# Patient Record
Sex: MALE | Race: WHITE | HISPANIC OR LATINO | Employment: FULL TIME | URBAN - METROPOLITAN AREA
[De-identification: names, ages, dates, MRNs, and addresses within clinical notes are randomized per-mention and may not be internally consistent; named-entity substitution may affect disease eponyms.]

---

## 2017-01-24 ENCOUNTER — TELEPHONE (OUTPATIENT)
Dept: MEDICAL GROUP | Facility: MEDICAL CENTER | Age: 37
End: 2017-01-24

## 2017-01-24 NOTE — TELEPHONE ENCOUNTER
Future Appointments       Provider Department Center    2/1/2017 7:40 AM Giovani Johansen M.D. [facility] [address]      Called Mark Wood in order to verify health topics prior to the Annual/N2U fior:      1)  All medications were updated? yes  2)  Allergies were updated? yes  3)  All care teams were updated? N\A  4)  All pharmacies were updated? yes and N\A          Health Maintenance Due   Topic Date Due   • IMM DTaP/Tdap/Td Vaccine (1 - Tdap) 09/13/1999   • IMM INFLUENZA (1) 09/01/2016              Current Outpatient Prescriptions   Medication Sig Dispense Refill   • CLINDAMYCIN  MG PO CAPS Take 1 Cap by mouth 3 times a day. 21 Cap 0   • HYDROCODONE-ACETAMINOPHEN 5-500 MG PO TABS Take 1-2 Tabs by mouth every four hours as needed for Mild Pain. 10 Each 0     No current facility-administered medications for this visit.     5)  Web Iz Recommendations:         1) Tdap, Influenza                      7)  Previous PCP Med Rec will be obtained via:       1) None       8)  Notes to provider or MA:       1) Establish care        2) Blood test       Pt was encouraged to keep the appointment and to arrive at least 30 minutes early. Patient is also aware that they must be on time or they would need to reschedule.    Provider name: DR JOHANSEN

## 2017-02-01 ENCOUNTER — OFFICE VISIT (OUTPATIENT)
Dept: MEDICAL GROUP | Facility: MEDICAL CENTER | Age: 37
End: 2017-02-01
Payer: COMMERCIAL

## 2017-02-01 VITALS
BODY MASS INDEX: 29.32 KG/M2 | RESPIRATION RATE: 16 BRPM | DIASTOLIC BLOOD PRESSURE: 74 MMHG | WEIGHT: 176 LBS | HEIGHT: 65 IN | TEMPERATURE: 98.7 F | SYSTOLIC BLOOD PRESSURE: 118 MMHG | OXYGEN SATURATION: 97 % | HEART RATE: 68 BPM

## 2017-02-01 DIAGNOSIS — E16.2 HYPOGLYCEMIA: ICD-10-CM

## 2017-02-01 DIAGNOSIS — Z13.220 SCREENING, LIPID: ICD-10-CM

## 2017-02-01 DIAGNOSIS — R42 DIZZINESS: ICD-10-CM

## 2017-02-01 DIAGNOSIS — E78.5 DYSLIPIDEMIA: ICD-10-CM

## 2017-02-01 DIAGNOSIS — Z23 INFLUENZA VACCINE NEEDED: ICD-10-CM

## 2017-02-01 PROCEDURE — 90471 IMMUNIZATION ADMIN: CPT | Performed by: INTERNAL MEDICINE

## 2017-02-01 PROCEDURE — 99204 OFFICE O/P NEW MOD 45 MIN: CPT | Mod: 25 | Performed by: INTERNAL MEDICINE

## 2017-02-01 PROCEDURE — 90686 IIV4 VACC NO PRSV 0.5 ML IM: CPT | Performed by: INTERNAL MEDICINE

## 2017-02-01 ASSESSMENT — PATIENT HEALTH QUESTIONNAIRE - PHQ9: CLINICAL INTERPRETATION OF PHQ2 SCORE: 0

## 2017-02-01 NOTE — PROGRESS NOTES
"CC: Establishing care dizziness    HPI:   Mark presents today with the following.    1. Dyslipidemia  Presents on having seen a doctor in several years. He reports a history of elevated cholesterol never been treated. Last blood work was several years ago values are unknown.    2. Dizziness  Biggest complaint is episodes of dizziness. He relates this to when he eats something sweet. He does have a family history of diabetes on his mother's side. He states 30 minutes after eating something sweet he'll get slightly dizzy. He reports if it's during the day was working is less than issue. He also reports he does not do his bed after meals with sweets. He did report some increased thirst proximally one month ago but that is since resolved. He denies any frequent urination. No blurred vision or slurred speech and denies abdominal pain or diarrhea. Denies chest pain or palpitations no edema.          Patient Active Problem List    Diagnosis Date Noted   • Dyslipidemia 02/01/2017       No current outpatient prescriptions on file.     No current facility-administered medications for this visit.         Allergies as of 02/01/2017   • (No Known Allergies)        ROS: As per HPI.    /74 mmHg  Pulse 68  Temp(Src) 37.1 °C (98.7 °F)  Resp 16  Ht 1.651 m (5' 5\")  Wt 79.833 kg (176 lb)  BMI 29.29 kg/m2  SpO2 97%    Physical Exam:  Gen:         Alert and oriented, No apparent distress.  Neck:        No Lymphadenopathy or Bruits.  Lungs:     Clear to auscultation bilaterally  CV:          Regular rate and rhythm. No murmurs, rubs or gallops.               Ext:          No clubbing, cyanosis, edema.      Assessment and Plan.   36 y.o. male with the following issues.    1. Dyslipidemia  sending for repeat cholesterol.    2. Dizziness  Symptoms sound most specific for possible hypoglycemia. Have made recommendations for eating more proteins if he is having carbohydrates. Avoidance of caffeine. Getting blood work along with " A1c. If blood work is normal in symptoms persist he will follow-up.  - HEMOGLOBIN A1C; Future    3. Hypoglycemia  Subjective diagnosis of hypoglycemia currently.    4. Influenza vaccine needed    - INFLUENZA VACCINE QUAD INJ >3Y(PF)    5. Screening, lipid    - COMP METABOLIC PANEL; Future  - LIPID PROFILE; Future

## 2017-02-01 NOTE — MR AVS SNAPSHOT
"        Mark Wood   2017 7:40 AM   Office Visit   MRN: [id number]    Department:  [address] [place] [address]   Dept Phone:  [phone]    Description:  Male : 1980   Provider:  Giovani Martinez M.D.           Reason for Visit     Establish Care Labs request      Allergies as of 2017     No Known Allergies      You were diagnosed with     Dyslipidemia   [336572]       Dizziness   [066080]       Hypoglycemia   [304405]       Influenza vaccine needed   [896300]       Screening, lipid   [130498]         Vital Signs     Blood Pressure Pulse Temperature Respirations Height Weight    118/74 mmHg 68 37.1 °C (98.7 °F) 16 1.651 m (5' 5\") 79.833 kg (176 lb)    Body Mass Index Oxygen Saturation Smoking Status             29.29 kg/m2 97% Never Smoker          Basic Information     Date Of Birth Sex Race Ethnicity Preferred Language    1980 Male  or   Origin (Occitan,Maltese,Djiboutian,Finnish, etc) English      Problem List              ICD-10-CM Priority Class Noted - Resolved    Dyslipidemia E78.5   2017 - Present      Health Maintenance        Date Due Completion Dates    IMM DTaP/Tdap/Td Vaccine (1 - Tdap) 1999 ---    IMM INFLUENZA (1) 2016, 2010            Current Immunizations     Influenza Vaccine Quad Inj (Pf) 2017  8:10 AM, 2010    Influenza Vaccine Quad Inj (Preserved) 2014    Tdap Vaccine 2012      Below and/or attached are the medications your provider expects you to take. Review all of your home medications and newly ordered medications with your provider and/or pharmacist. Follow medication instructions as directed by your provider and/or pharmacist. Please keep your medication list with you and share with your provider. Update the information when medications are discontinued, doses are changed, or new medications (including over-the-counter products) are added; and carry medication information at all times in the " event of emergency situations     Allergies:  No Known Allergies          Medications  Valid as of: February 01, 2017 -  8:12 AM    Generic Name Brand Name Tablet Size Instructions for use    .                 Medicines prescribed today were sent to:     [facility] [address] [place] [address], [place] - [address]    [address] [place] [address]    Phone: [phone] Fax: [phone]    Open 24 Hours?: No      Medication refill instructions:       If your prescription bottle indicates you have medication refills left, it is not necessary to call your provider’s office. Please contact your pharmacy and they will refill your medication.    If your prescription bottle indicates you do not have any refills left, you may request refills at any time through one of the following ways: The online Amity system (except Urgent Care), by calling your provider’s office, or by asking your pharmacy to contact your provider’s office with a refill request. Medication refills are processed only during regular business hours and may not be available until the next business day. Your provider may request additional information or to have a follow-up visit with you prior to refilling your medication.   *Please Note: Medication refills are assigned a new Rx number when refilled electronically. Your pharmacy may indicate that no refills were authorized even though a new prescription for the same medication is available at the pharmacy. Please request the medicine by name with the pharmacy before contacting your provider for a refill.        Your To Do List     Future Labs/Procedures Complete By Expires    COMP METABOLIC PANEL  As directed 2/2/2018    HEMOGLOBIN A1C  As directed 2/2/2018    LIPID PROFILE  As directed 2/2/2018         Amity Access Code: [email or web address]  Expires: 2/16/2017 10:23 AM    Amity  A secure, online tool to manage your health information     [employer]’s Amity® is a secure, online tool that connects you to your  personalized health information from the privacy of your home -- day or night - making it very easy for you to manage your healthcare. Once the activation process is completed, you can even access your medical information using the Picket rosy, which is available for free in the Apple Rosy store or Google Play store.     Picket provides the following levels of access (as shown below):   My Chart Features   [facility] Primary [facility] Doctor [facility]  Specialists [facility]  Urgent  Care Non-[facility]  Primary Care  Doctor   Email your healthcare team securely and privately 24/7 X X X    Manage appointments: schedule your next appointment; view details of past/upcoming appointments X      Request prescription refills. X      View recent personal medical records, including lab and immunizations X X X X   View health record, including health history, allergies, medications X X X X   Read reports about your outpatient visits, procedures, consult and ER notes X X X X   See your discharge summary, which is a recap of your hospital and/or ER visit that includes your diagnosis, lab results, and care plan. X X       How to register for Picket:  1. Go to  [email or web address].  2. Click on the Sign Up Now box, which takes you to the New Member Sign Up page. You will need to provide the following information:  a. Enter your Picket Access Code exactly as it appears at the top of this page. (You will not need to use this code after you’ve completed the sign-up process. If you do not sign up before the expiration date, you must request a new code.)   b. Enter your date of birth.   c. Enter your home email address.   d. Click Submit, and follow the next screen’s instructions.  3. Create a Picket ID. This will be your Picket login ID and cannot be changed, so think of one that is secure and easy to remember.  4. Create a Picket password. You can change your password at any time.  5. Enter your Password Reset Question and Answer. This can  be used at a later time if you forget your password.   6. Enter your e-mail address. This allows you to receive e-mail notifications when new information is available in [employer].  7. Click Sign Up. You can now view your health information.    For assistance activating your [employer] account, call [phone]

## 2017-02-04 ENCOUNTER — HOSPITAL ENCOUNTER (OUTPATIENT)
Dept: LAB | Facility: MEDICAL CENTER | Age: 37
End: 2017-02-04
Attending: INTERNAL MEDICINE
Payer: COMMERCIAL

## 2017-02-04 DIAGNOSIS — R42 DIZZINESS: ICD-10-CM

## 2017-02-04 DIAGNOSIS — Z13.220 SCREENING, LIPID: ICD-10-CM

## 2017-02-04 LAB
ALBUMIN SERPL BCP-MCNC: 4.8 G/DL (ref 3.2–4.9)
ALBUMIN/GLOB SERPL: 2 G/DL
ALP SERPL-CCNC: 82 U/L (ref 30–99)
ALT SERPL-CCNC: 73 U/L (ref 2–50)
ANION GAP SERPL CALC-SCNC: 11 MMOL/L (ref 0–11.9)
AST SERPL-CCNC: 41 U/L (ref 12–45)
BILIRUB SERPL-MCNC: 0.6 MG/DL (ref 0.1–1.5)
BUN SERPL-MCNC: 17 MG/DL (ref 8–22)
CALCIUM SERPL-MCNC: 9.5 MG/DL (ref 8.5–10.5)
CHLORIDE SERPL-SCNC: 103 MMOL/L (ref 96–112)
CHOLEST SERPL-MCNC: 236 MG/DL (ref 100–199)
CO2 SERPL-SCNC: 25 MMOL/L (ref 20–33)
CREAT SERPL-MCNC: 0.88 MG/DL (ref 0.5–1.4)
EST. AVERAGE GLUCOSE BLD GHB EST-MCNC: 105 MG/DL
GLOBULIN SER CALC-MCNC: 2.4 G/DL (ref 1.9–3.5)
GLUCOSE SERPL-MCNC: 85 MG/DL (ref 65–99)
HBA1C MFR BLD: 5.3 % (ref 0–5.6)
HDLC SERPL-MCNC: 37 MG/DL
LDLC SERPL CALC-MCNC: 133 MG/DL
POTASSIUM SERPL-SCNC: 3.9 MMOL/L (ref 3.6–5.5)
PROT SERPL-MCNC: 7.2 G/DL (ref 6–8.2)
SODIUM SERPL-SCNC: 139 MMOL/L (ref 135–145)
TRIGL SERPL-MCNC: 329 MG/DL (ref 0–149)

## 2017-02-04 PROCEDURE — 80053 COMPREHEN METABOLIC PANEL: CPT

## 2017-02-04 PROCEDURE — 36415 COLL VENOUS BLD VENIPUNCTURE: CPT

## 2017-02-04 PROCEDURE — 83036 HEMOGLOBIN GLYCOSYLATED A1C: CPT

## 2017-02-04 PROCEDURE — 80061 LIPID PANEL: CPT

## 2017-03-30 ENCOUNTER — OFFICE VISIT (OUTPATIENT)
Dept: MEDICAL GROUP | Facility: MEDICAL CENTER | Age: 37
End: 2017-03-30
Payer: COMMERCIAL

## 2017-03-30 VITALS
TEMPERATURE: 97 F | WEIGHT: 179 LBS | HEIGHT: 65 IN | SYSTOLIC BLOOD PRESSURE: 118 MMHG | BODY MASS INDEX: 29.82 KG/M2 | OXYGEN SATURATION: 96 % | DIASTOLIC BLOOD PRESSURE: 74 MMHG | RESPIRATION RATE: 16 BRPM | HEART RATE: 72 BPM

## 2017-03-30 DIAGNOSIS — E78.5 DYSLIPIDEMIA: ICD-10-CM

## 2017-03-30 DIAGNOSIS — R79.89 LFTS ABNORMAL: ICD-10-CM

## 2017-03-30 DIAGNOSIS — M54.2 NECK PAIN: ICD-10-CM

## 2017-03-30 PROCEDURE — 99214 OFFICE O/P EST MOD 30 MIN: CPT | Performed by: INTERNAL MEDICINE

## 2017-03-30 RX ORDER — TIZANIDINE 4 MG/1
4 TABLET ORAL 3 TIMES DAILY
Qty: 30 TAB | Refills: 1 | Status: SHIPPED | OUTPATIENT
Start: 2017-03-30 | End: 2017-11-19

## 2017-03-30 RX ORDER — IBUPROFEN 600 MG/1
600 TABLET ORAL EVERY 8 HOURS PRN
Qty: 30 TAB | Refills: 0 | Status: SHIPPED | OUTPATIENT
Start: 2017-03-30 | End: 2017-11-19

## 2017-03-30 NOTE — MR AVS SNAPSHOT
"        Mark Wood   3/30/2017 7:40 AM   Office Visit   MRN: [id number]    Department:  [address] [place] [address]   Dept Phone:  [phone]    Description:  Male : 1980   Provider:  Giovani Martinez M.D.           Reason for Visit     Neck Pain F/U labs      Allergies as of 3/30/2017     No Known Allergies      You were diagnosed with     Dyslipidemia   [639789]       LFTs abnormal   [475818]       Neck pain   [010222]         Vital Signs     Blood Pressure Pulse Temperature Respirations Height Weight    118/74 mmHg 72 36.1 °C (97 °F) 16 1.651 m (5' 5\") 81.194 kg (179 lb)    Body Mass Index Oxygen Saturation Smoking Status             29.79 kg/m2 96% Never Smoker          Basic Information     Date Of Birth Sex Race Ethnicity Preferred Language    1980 Male  or   Origin (Anguillan,Northern Irish,Togolese,Eric, etc) English      Problem List              ICD-10-CM Priority Class Noted - Resolved    Dyslipidemia E78.5   2017 - Present    LFTs abnormal R79.89   3/30/2017 - Present      Health Maintenance        Date Due Completion Dates    IMM DTaP/Tdap/Td Vaccine (2 - Td) 2022            Current Immunizations     Influenza Vaccine Quad Inj (Pf) 2017  8:10 AM, 2010    Influenza Vaccine Quad Inj (Preserved) 2014    Tdap Vaccine 2012      Below and/or attached are the medications your provider expects you to take. Review all of your home medications and newly ordered medications with your provider and/or pharmacist. Follow medication instructions as directed by your provider and/or pharmacist. Please keep your medication list with you and share with your provider. Update the information when medications are discontinued, doses are changed, or new medications (including over-the-counter products) are added; and carry medication information at all times in the event of emergency situations     Allergies:  No Known Allergies          Medications  " Valid as of: March 30, 2017 -  8:02 AM    Generic Name Brand Name Tablet Size Instructions for use    Ibuprofen (Tab) MOTRIN 600 MG Take 1 Tab by mouth every 8 hours as needed.        TiZANidine HCl (Tab) ZANAFLEX 4 MG Take 1 Tab by mouth 3 times a day.        .                 Medicines prescribed today were sent to:     [facility] [address] [place] [address], [place] - [address]    [address] [place] [address]    Phone: [phone] Fax: [phone]    Open 24 Hours?: No      Medication refill instructions:       If your prescription bottle indicates you have medication refills left, it is not necessary to call your provider’s office. Please contact your pharmacy and they will refill your medication.    If your prescription bottle indicates you do not have any refills left, you may request refills at any time through one of the following ways: The online [employer] system (except Urgent Care), by calling your provider’s office, or by asking your pharmacy to contact your provider’s office with a refill request. Medication refills are processed only during regular business hours and may not be available until the next business day. Your provider may request additional information or to have a follow-up visit with you prior to refilling your medication.   *Please Note: Medication refills are assigned a new Rx number when refilled electronically. Your pharmacy may indicate that no refills were authorized even though a new prescription for the same medication is available at the pharmacy. Please request the medicine by name with the pharmacy before contacting your provider for a refill.        Your To Do List     Future Labs/Procedures Complete By Expires    CBC WITH DIFFERENTIAL  As directed 3/31/2018    HEP B CORE AB IGM  As directed 3/31/2018    HEP B SURFACE AB  As directed 3/31/2018    HEP B SURFACE ANTIGEN  As directed 3/31/2018    HEP C VIRUS ANTIBODY  As directed 3/31/2018    HEPATIC FUNCTION PANEL  As directed 3/30/2018     US-LIVER AND BILIARY TREE  As directed 9/30/2017         [employer] Access Code: Activation code not generated  Current [place]err Status: Active

## 2017-03-30 NOTE — PROGRESS NOTES
"CC: Follow-up multiple issues complaining of neck pain.    HPI:   Mark presents today with the following.    1. Dyslipidemia  Presents after having blood work showing elevated triglycerides and low HDL. He reports his weight has gone up 10-15 pounds in the recent past without heavy activity.    2. LFTs abnormal  ALT also elevated at 73. He denies any significant alcohol consumption. He has no abdominal pain or jaundice no changes to stool and no nausea or vomiting. He denies any high-risk behaviors in the past.    3. Neck pain  Main complaint is left-sided neck pain. He works as a  frequently painting ceilings. States his pain is for a tendon intensity and worse with activity of her long day. He denies any numbness or tingling in his extremities and no weakness. He does take ibuprofen occasionally at low dose which is somewhat helpful.      Patient Active Problem List    Diagnosis Date Noted   • LFTs abnormal 03/30/2017   • Dyslipidemia 02/01/2017       Current Outpatient Prescriptions   Medication Sig Dispense Refill   • ibuprofen (MOTRIN) 600 MG Tab Take 1 Tab by mouth every 8 hours as needed. 30 Tab 0   • tizanidine (ZANAFLEX) 4 MG Tab Take 1 Tab by mouth 3 times a day. 30 Tab 1     No current facility-administered medications for this visit.         Allergies as of 03/30/2017   • (No Known Allergies)        ROS: As per HPI.    /74 mmHg  Pulse 72  Temp(Src) 36.1 °C (97 °F)  Resp 16  Ht 1.651 m (5' 5\")  Wt 81.194 kg (179 lb)  BMI 29.79 kg/m2  SpO2 96%    Physical Exam:  Gen:         Alert and oriented, No apparent distress.  Neck:        No Lymphadenopathy or Bruits.  Lungs:     Clear to auscultation bilaterally  CV:          Regular rate and rhythm. No murmurs, rubs or gallops.               Ext:          No clubbing, cyanosis, edema.      Assessment and Plan.   36 y.o. male with the following issues.    1. Dyslipidemia  Discussion about diet and exercise mild weight loss.    2. LFTs " abnormal  Have written for further blood work as well as ultrasound follow-up abnormalities.  - HEP B SURFACE AB; Future  - HEP B SURFACE ANTIGEN; Future  - HEP B CORE AB IGM; Future  - HEP C VIRUS ANTIBODY; Future  - HEPATIC FUNCTION PANEL; Future  - CBC WITH DIFFERENTIAL; Future  - US-LIVER AND BILIARY TREE; Future    3. Neck pain  Muscular nature have recommended conservative therapy with ibuprofen for the next 7 days as well as muscle relaxant. Discussed physical therapy if not improving will place referral.  - ibuprofen (MOTRIN) 600 MG Tab; Take 1 Tab by mouth every 8 hours as needed.  Dispense: 30 Tab; Refill: 0  - tizanidine (ZANAFLEX) 4 MG Tab; Take 1 Tab by mouth 3 times a day.  Dispense: 30 Tab; Refill: 1

## 2017-04-19 ENCOUNTER — HOSPITAL ENCOUNTER (OUTPATIENT)
Dept: RADIOLOGY | Facility: MEDICAL CENTER | Age: 37
End: 2017-04-19
Attending: INTERNAL MEDICINE
Payer: COMMERCIAL

## 2017-04-19 DIAGNOSIS — R79.89 LFTS ABNORMAL: ICD-10-CM

## 2017-04-19 PROCEDURE — 76705 ECHO EXAM OF ABDOMEN: CPT

## 2017-06-23 ENCOUNTER — OFFICE VISIT (OUTPATIENT)
Dept: MEDICAL GROUP | Facility: PHYSICIAN GROUP | Age: 37
End: 2017-06-23
Payer: COMMERCIAL

## 2017-06-23 VITALS
TEMPERATURE: 97.6 F | WEIGHT: 169 LBS | BODY MASS INDEX: 28.16 KG/M2 | HEART RATE: 76 BPM | SYSTOLIC BLOOD PRESSURE: 108 MMHG | OXYGEN SATURATION: 97 % | HEIGHT: 65 IN | RESPIRATION RATE: 14 BRPM | DIASTOLIC BLOOD PRESSURE: 70 MMHG

## 2017-06-23 DIAGNOSIS — J01.00 SUBACUTE MAXILLARY SINUSITIS: ICD-10-CM

## 2017-06-23 PROCEDURE — 99214 OFFICE O/P EST MOD 30 MIN: CPT | Performed by: FAMILY MEDICINE

## 2017-06-23 RX ORDER — FLUTICASONE PROPIONATE 50 MCG
2 SPRAY, SUSPENSION (ML) NASAL DAILY
Qty: 16 G | Refills: 11 | Status: SHIPPED | OUTPATIENT
Start: 2017-06-23 | End: 2017-11-19

## 2017-06-23 RX ORDER — FEXOFENADINE HCL 60 MG/1
60 TABLET, FILM COATED ORAL DAILY
COMMUNITY
End: 2017-11-19

## 2017-06-23 RX ORDER — AMOXICILLIN AND CLAVULANATE POTASSIUM 875; 125 MG/1; MG/1
1 TABLET, FILM COATED ORAL 2 TIMES DAILY
Qty: 20 TAB | Refills: 0 | Status: SHIPPED | OUTPATIENT
Start: 2017-06-23 | End: 2017-07-03

## 2017-06-23 NOTE — PROGRESS NOTES
Chief Complaint   Patient presents with   • Cough     x 3 wks   • Nasal Congestion       HISTORY OF PRESENT ILLNESS: Patient is a 36 y.o. male established patient here today for the following concerns:    1. Subacute maxillary sinusitis  This is a pleasant 36 year old male patient of Dr. Martinez here today for cold like symptoms of cough, congestion, sore throat for 3 weeks not responding to OTC relievers.  Most relief has been using allergy medications.  Feeling a bit ill but no fevers, chills.  Describes facial pain and pressure, worse with bending forward  Or lying down at night.        Past Medical, Social, and Family history reviewed and updated in EPIC    Allergies:Review of patient's allergies indicates no known allergies.    Current Outpatient Prescriptions   Medication Sig Dispense Refill   • fexofenadine (ALLEGRA) 60 MG Tab Take 60 mg by mouth every day.     • fluticasone (FLONASE) 50 MCG/ACT nasal spray Spray 2 Sprays in nose every day. Each Nostril 16 g 11   • amoxicillin-clavulanate (AUGMENTIN) 875-125 MG Tab Take 1 Tab by mouth 2 times a day for 10 days. 20 Tab 0   • ibuprofen (MOTRIN) 600 MG Tab Take 1 Tab by mouth every 8 hours as needed. 30 Tab 0   • tizanidine (ZANAFLEX) 4 MG Tab Take 1 Tab by mouth 3 times a day. 30 Tab 1     No current facility-administered medications for this visit.         ROS:  Review of Systems   Constitutional: Negative for fever, chills, weight loss and malaise/fatigue.   HENT: Negative for ear pain, nosebleeds, congestion, sore throat and neck pain.    Eyes: Negative for blurred vision.   Respiratory: Negative for cough, sputum production, shortness of breath and wheezing.    Cardiovascular: Negative for chest pain, palpitations,  and leg swelling.   Gastrointestinal: Negative for heartburn, nausea, vomiting, diarrhea and abdominal pain.   Genitourinary: Negative for dysuria, urgency and frequency.   Musculoskeletal: Negative for myalgias, back pain and joint pain.   Skin:  "Negative for rash and itching.   Neurological: Negative for dizziness, tingling, tremors, sensory change, focal weakness and headaches.   Endo/Heme/Allergies: Does not bruise/bleed easily.   Psychiatric/Behavioral: Negative for depression, anxiety, suicidal ideas, insomnia and memory loss.      Exam:  Blood pressure 108/70, pulse 76, temperature 36.4 °C (97.6 °F), resp. rate 14, height 1.651 m (5' 5\"), weight 76.658 kg (169 lb), SpO2 97 %.    General:  Well nourished, well developed in NAD  Head is grossly normal.  HEENT: TM clear bilaterally.  Significant PND. Nasal mucosa edematous with purulent nasal discharge.  Sinus tenderness on palpation.   Neck: Supple without JVD   Pulmonary:  Normal effort. CTAB no w/r/c  Cardiovascular: Regular rate  Extremities: no clubbing, cyanosis, or edema.  Psych: affect appropriate      Please note that this dictation was created using voice recognition software. I have made every reasonable attempt to correct obvious errors, but I expect that there are errors of grammar and possibly content that I did not discover before finalizing the note.    Assessment/Plan:  1. Subacute maxillary sinusitis  Start   - fluticasone (FLONASE) 50 MCG/ACT nasal spray; Spray 2 Sprays in nose every day. Each Nostril  Dispense: 16 g; Refill: 11  - amoxicillin-clavulanate (AUGMENTIN) 875-125 MG Tab; Take 1 Tab by mouth 2 times a day for 10 days.  Dispense: 20 Tab; Refill: 0  May continue allegra for allergy component.     Follow up 1 month.           "

## 2017-06-23 NOTE — MR AVS SNAPSHOT
"        Mark Wood   2017 1:40 PM   Office Visit   MRN: [id number]    Department:  [facility]   Dept Phone:  [phone]    Description:  Male : 1980   Provider:  Brea Chowdary M.D.           Reason for Visit     Cough x 3 wks    Nasal Congestion           Allergies as of 2017     No Known Allergies      You were diagnosed with     Subacute maxillary sinusitis   [[id number]]         Vital Signs     Blood Pressure Pulse Temperature Respirations Height Weight    108/70 mmHg 76 36.4 °C (97.6 °F) 14 1.651 m (5' 5\") 76.658 kg (169 lb)    Body Mass Index Oxygen Saturation Smoking Status             28.12 kg/m2 97% Never Smoker          Basic Information     Date Of Birth Sex Race Ethnicity Preferred Language    1980 Male  or   Origin (Vietnamese,Russian,Wallisian,Eric, etc) English      Problem List              ICD-10-CM Priority Class Noted - Resolved    Dyslipidemia E78.5   2017 - Present    LFTs abnormal R79.89   3/30/2017 - Present      Health Maintenance        Date Due Completion Dates    IMM DTaP/Tdap/Td Vaccine (2 - Td) 2022            Current Immunizations     Influenza Vaccine Quad Inj (Pf) 2017  8:10 AM, 2010    Influenza Vaccine Quad Inj (Preserved) 2014    Tdap Vaccine 2012      Below and/or attached are the medications your provider expects you to take. Review all of your home medications and newly ordered medications with your provider and/or pharmacist. Follow medication instructions as directed by your provider and/or pharmacist. Please keep your medication list with you and share with your provider. Update the information when medications are discontinued, doses are changed, or new medications (including over-the-counter products) are added; and carry medication information at all times in the event of emergency situations     Allergies:  No Known Allergies          Medications  Valid as of: " 23, 2017 -  3:11 PM    Generic Name Brand Name Tablet Size Instructions for use    Amoxicillin-Pot Clavulanate (Tab) AUGMENTIN 875-125 MG Take 1 Tab by mouth 2 times a day for 10 days.        Fexofenadine HCl (Tab) ALLEGRA 60 MG Take 60 mg by mouth every day.        Fluticasone Propionate (Suspension) FLONASE 50 MCG/ACT Spray 2 Sprays in nose every day. Each Nostril        Ibuprofen (Tab) MOTRIN 600 MG Take 1 Tab by mouth every 8 hours as needed.        TiZANidine HCl (Tab) ZANAFLEX 4 MG Take 1 Tab by mouth 3 times a day.        .                 Medicines prescribed today were sent to:     [facility] [address] [facility] [address]     [address] [facility] [address]    Phone: [phone] Fax: [phone]    Open 24 Hours?: No    [facility] [address] [place] [address] [address] [address] [facility] [address]    Phone: [phone] Fax: [phone]    Open 24 Hours?: No      Medication refill instructions:       If your prescription bottle indicates you have medication refills left, it is not necessary to call your provider’s office. Please contact your pharmacy and they will refill your medication.    If your prescription bottle indicates you do not have any refills left, you may request refills at any time through one of the following ways: The online Preclick system (except Urgent Care), by calling your provider’s office, or by asking your pharmacy to contact your provider’s office with a refill request. Medication refills are processed only during regular business hours and may not be available until the next business day. Your provider may request additional information or to have a follow-up visit with you prior to refilling your medication.   *Please Note: Medication refills are assigned a new Rx number when refilled electronically. Your pharmacy may indicate that no refills were authorized even though a new prescription for the same medication is available at the pharmacy. Please  request the medicine by name with the pharmacy before contacting your provider for a refill.           MyChart Access Code: Activation code not generated  Current MyChart Status: Active

## 2017-06-23 NOTE — Clinical Note
June 23, 2017        Patient: Mark Wood   YOB: 1980   Date of Visit: 6/23/2017           To Whom It May Concern:     Mark Wood was evaluated today.    If you have any questions or concerns, please don't hesitate to call.        Sincerely,          Brea Chowdary M.D.  Electronically Signed    [address] [id number] [phone] (Phone)  [phone] (Fax)

## 2017-11-19 ENCOUNTER — HOSPITAL ENCOUNTER (EMERGENCY)
Facility: MEDICAL CENTER | Age: 37
End: 2017-11-20
Attending: EMERGENCY MEDICINE
Payer: COMMERCIAL

## 2017-11-19 DIAGNOSIS — S61.112A LACERATION OF LEFT THUMB WITHOUT FOREIGN BODY WITH DAMAGE TO NAIL, INITIAL ENCOUNTER: ICD-10-CM

## 2017-11-19 PROCEDURE — 99284 EMERGENCY DEPT VISIT MOD MDM: CPT

## 2017-11-19 PROCEDURE — 700111 HCHG RX REV CODE 636 W/ 250 OVERRIDE (IP): Performed by: EMERGENCY MEDICINE

## 2017-11-19 PROCEDURE — 90715 TDAP VACCINE 7 YRS/> IM: CPT | Performed by: EMERGENCY MEDICINE

## 2017-11-19 PROCEDURE — 90471 IMMUNIZATION ADMIN: CPT

## 2017-11-19 RX ORDER — BUPIVACAINE HYDROCHLORIDE 2.5 MG/ML
10 INJECTION, SOLUTION EPIDURAL; INFILTRATION; INTRACAUDAL ONCE
Status: COMPLETED | OUTPATIENT
Start: 2017-11-19 | End: 2017-11-19

## 2017-11-19 RX ADMIN — BUPIVACAINE HYDROCHLORIDE 10 ML: 2.5 INJECTION, SOLUTION EPIDURAL; INFILTRATION; INTRACAUDAL; PERINEURAL at 23:45

## 2017-11-19 RX ADMIN — CLOSTRIDIUM TETANI TOXOID ANTIGEN (FORMALDEHYDE INACTIVATED), CORYNEBACTERIUM DIPHTHERIAE TOXOID ANTIGEN (FORMALDEHYDE INACTIVATED), BORDETELLA PERTUSSIS TOXOID ANTIGEN (GLUTARALDEHYDE INACTIVATED), BORDETELLA PERTUSSIS FILAMENTOUS HEMAGGLUTININ ANTIGEN (FORMALDEHYDE INACTIVATED), BORDETELLA PERTUSSIS PERTACTIN ANTIGEN, AND BORDETELLA PERTUSSIS FIMBRIAE 2/3 ANTIGEN 0.5 ML: 5; 2; 2.5; 5; 3; 5 INJECTION, SUSPENSION INTRAMUSCULAR at 23:30

## 2017-11-20 VITALS
HEIGHT: 65 IN | OXYGEN SATURATION: 98 % | RESPIRATION RATE: 16 BRPM | SYSTOLIC BLOOD PRESSURE: 111 MMHG | TEMPERATURE: 97.4 F | WEIGHT: 173.28 LBS | BODY MASS INDEX: 28.87 KG/M2 | DIASTOLIC BLOOD PRESSURE: 83 MMHG | HEART RATE: 78 BPM

## 2017-11-20 PROCEDURE — 700102 HCHG RX REV CODE 250 W/ 637 OVERRIDE(OP): Performed by: EMERGENCY MEDICINE

## 2017-11-20 PROCEDURE — 304999 HCHG REPAIR-SIMPLE/INTERMED LEVEL 1

## 2017-11-20 PROCEDURE — 304217 HCHG IRRIGATION SYSTEM

## 2017-11-20 PROCEDURE — 303747 HCHG EXTRA SUTURE

## 2017-11-20 PROCEDURE — A9270 NON-COVERED ITEM OR SERVICE: HCPCS | Performed by: EMERGENCY MEDICINE

## 2017-11-20 RX ORDER — CEPHALEXIN 500 MG/1
500 CAPSULE ORAL ONCE
Status: COMPLETED | OUTPATIENT
Start: 2017-11-20 | End: 2017-11-20

## 2017-11-20 RX ORDER — CEPHALEXIN 500 MG/1
500 CAPSULE ORAL 4 TIMES DAILY
Qty: 40 CAP | Refills: 0 | Status: SHIPPED | OUTPATIENT
Start: 2017-11-20 | End: 2017-11-30

## 2017-11-20 RX ADMIN — CEPHALEXIN 500 MG: 500 CAPSULE ORAL at 01:39

## 2017-11-20 NOTE — ED NOTES
Pt presents to ED for c/o a laceration to the R thumb. Pt states this evening around 2245 he was at home and was carving a piece of wood with a razor knife when the knife slipped and cut his R thumb. Large, deep laceration noted to the pad of the R thumb. Bleeding is currently controlled with large dressing. Pt states it has been 5 - 5+ years for last tetanus shot.

## 2017-11-20 NOTE — ED NOTES
Discharge instructions given to patient, prescriptions provided, a verbal understanding of all instructions was stated. Pt preferred to walk out accompanied by son. VSS,  all belongings accounted for.

## 2017-11-20 NOTE — ED PROVIDER NOTES
" ED Provider Note    Scribed for Cory Garzon D.O. by Earnest Elder. 11/19/2017  11:20 PM    Primary care provider: Giovani Martinez M.D.   History obtained from: Patient   History limited by: None     CHIEF COMPLAINT  Chief Complaint   Patient presents with   • Laceration     pt with 1cm lac to lt thumb with knife while carving wood bleeding controlled, last tetanus 5 years ago        HPI    Mark Wood is a 37 y.o. male who presents to the ED due to a laceration to his left thumb, which he obtained at 10:45 PM tonight while carving wood. The patient is right-hand dominant. The patient believes that his last tetanus vaccination was approximately 5 years ago. Denies any other injuries.     Patient states he was carving wood when he accidentally slipped and cut his left thumb. He denies any other injuries. He reports pain at the site of injury and denies pain anywhere else.    REVIEW OF SYSTEMS  Please see HPI for pertinent positives/negatives.   E    PAST MEDICAL HISTORY  Past Medical History:   Diagnosis Date   • Hyperlipidemia         SURGICAL HISTORY  History reviewed. No pertinent surgical history.     SOCIAL HISTORY  Social History     Social History Main Topics   • Smoking status: Never Smoker   • Smokeless tobacco: Never Used   • Alcohol use 0.0 oz/week      Comment: RARELY   • Drug use: No   • Sexual activity: Yes        FAMILY HISTORY  Family History   Problem Relation Age of Onset   • Diabetes Mother    • Cancer Father         CURRENT MEDICATIONS  Home Medications     Reviewed by April Moya R.N. (Registered Nurse) on 11/19/17 at 2305  Med List Status: Complete   Medication Last Dose Status        Patient Alejandro Taking any Medications                        ALLERGIES  No Known Allergies     PHYSICAL EXAM  VITAL SIGNS: /99   Pulse 82   Temp 36.3 °C (97.4 °F)   Resp 16   Ht 1.651 m (5' 5\")   Wt 78.6 kg (173 lb 4.5 oz)   SpO2 96%   BMI 28.84 kg/m²  @MONICA[[id number]::@ "     Pulse ox interpretation:96% I interpret this pulse ox as normal     Constitutional: Well developed, well nourished, alert in no apparent distress, nontoxic appearance   HENT: No external signs of trauma, normocephalic   Eyes: No discharge, no icterus   Neck: Trachea midline, no stridor   Cardiovascular: Strong distal pulses and good perfusion   Thorax & Lungs: No respiratory distress   Extremities: No clubbing, no cyanosis, no edema, good ROM, laceration to the distal phalanx of left thumb with involvement of the lateral portion of the nail, tenderness to palpation as site of injury and nontender to palpation anywhere else, sensation intact to touch throughout, brisk cap refill   Skin: Warm, dry, no pallor/cyanosis, no rash noted   Neuro: A/O times 3, no focal deficits noted   Psychiatric: Cooperative, normal mood and affect, normal judgement, appropriate for clinical situation          DIAGNOSTIC STUDIES / PROCEDURES      RADIOLOGY  The radiologist's interpretation of all radiological studies have been reviewed by me.     No orders to display      Verbal consent was obtained for laceration repair.  Digital block was obtained using 3 mL of 0.25% Marcaine then the wound was irrigated with NS and sterilely prepped/draped.  Wound was explored without evidence of FB.  The laceration was closed with 5 simple interrupted sutures using 4-0 nylon.  Pt tolerated procedure well without complications.  Instructed pt to have sutures removed in approximately 10 days.  Pt also instructed on S/S of infection.    Total repaired wound length: 1.5 cm.      Tetanus updated in the ED        COURSE & MEDICAL DECISION MAKING  Nursing notes, VS, PMSFHx reviewed in chart.     Differential diagnoses considered include but are not limited to: Fx, dislocation, subluxation, contusion, strain, sprain, bursitis, tendonitis, neuropathy, radiculopathy, neurovascular injury, laceration, nail injury     11:20 PM- Patient seen and evaluated at  "bedside. I explained that I will need to perform a laceration repair procedure. He will also be given a tetanus vaccination.  Patient understands and agrees.     12:24 AM- The patient was placed in the appropriate position and anesthesia around the laceration was obtained by digital block using 0.25% Bupivacaine without epinephrine.     1:16 AM - Laceration repair procedure performed. Patient tolerated the procedure well. I discussed plans for discharge with a prescription for Keflex and instructed the patient to see his primary care provider in 10 days for suture removal. He should also return to the ED for any new or worsening symptoms. Patient understands and agrees. He will be treated with Keflex 500 mg PO prior to discharge. His vitals prior to discharge are: /99   Pulse 82   Temp 36.3 °C (97.4 °F)   Resp 16   Ht 1.651 m (5' 5\")   Wt 78.6 kg (173 lb 4.5 oz)   SpO2 96%   BMI 28.84 kg/m²      Patient presents to ED with above complaint. His left thumb laceration was closed using sutures without complications. His tetanus was updated. He was given Keflex and will be discharged home with a prescription for Keflex. Patient was instructed on good wound care and to have sutures removed in approximately 10 days. He was given return to ED precautions.    The patient is referred to a primary physician for blood pressure management, diabetic screening, and for all other preventative health concerns.       FINAL IMPRESSION  1. Laceration of left thumb without foreign body with damage to nail, initial encounter           DISPOSITION  Patient will be discharged home in stable condition.       FOLLOW UP  Giovani Martinez M.D.  [address] [facility] [address]  [facility] [id number] [phone]    In 10 days  For suture removal    [facility], Emergency Dept  [address] [facility] [address] [phone]    If symptoms worsen         OUTPATIENT MEDICATIONS  New Prescriptions    CEPHALEXIN (KEFLEX) " 500 MG CAP    Take 1 Cap by mouth 4 times a day for 10 days.           IEarnest (Scribe), am scribing for, and in the presence of, Cory Garzon D.O..    Electronically signed by: Earnest Elder (Scribe), 11/19/2017    Cory AGUAYO D.O. personally performed the services described in this documentation, as scribed by Earnest Elder in my presence, and it is both accurate and complete.      Portions of this record were made with voice recognition software and by scribes.  Despite my review, spelling/grammar/context errors may still remain.  Interpretation of this chart should be taken in this context.

## 2017-11-20 NOTE — ED NOTES
.  Chief Complaint   Patient presents with   • Laceration     pt with 1cm lac to lt thumb with knife while carving wood bleeding controlled, last tetanus 5 years ago     .Pt Informed regarding triage process and verbalized understanding to inform triage tech or RN for any changes in condition.  Placed in lobby.

## 2017-12-01 ENCOUNTER — OFFICE VISIT (OUTPATIENT)
Dept: MEDICAL GROUP | Facility: MEDICAL CENTER | Age: 37
End: 2017-12-01
Payer: COMMERCIAL

## 2017-12-01 VITALS
TEMPERATURE: 98.2 F | OXYGEN SATURATION: 96 % | WEIGHT: 173 LBS | HEIGHT: 65 IN | RESPIRATION RATE: 16 BRPM | BODY MASS INDEX: 28.82 KG/M2 | SYSTOLIC BLOOD PRESSURE: 120 MMHG | DIASTOLIC BLOOD PRESSURE: 84 MMHG | HEART RATE: 61 BPM

## 2017-12-01 DIAGNOSIS — S61.112D LACERATION OF LEFT THUMB WITHOUT FOREIGN BODY WITH DAMAGE TO NAIL, SUBSEQUENT ENCOUNTER: ICD-10-CM

## 2017-12-01 DIAGNOSIS — Z48.02 VISIT FOR SUTURE REMOVAL: ICD-10-CM

## 2017-12-01 PROCEDURE — 99213 OFFICE O/P EST LOW 20 MIN: CPT | Performed by: NURSE PRACTITIONER

## 2017-12-01 RX ORDER — CEPHALEXIN 500 MG/1
500 CAPSULE ORAL 4 TIMES DAILY
COMMUNITY
End: 2017-12-01 | Stop reason: SDUPTHER

## 2017-12-01 RX ORDER — CEPHALEXIN 500 MG/1
500 CAPSULE ORAL 4 TIMES DAILY
Qty: 20 CAP | Refills: 0 | Status: SHIPPED | OUTPATIENT
Start: 2017-12-01 | End: 2017-12-06

## 2017-12-01 RX ORDER — HYDROCODONE BITARTRATE AND ACETAMINOPHEN 5; 325 MG/1; MG/1
1 TABLET ORAL EVERY 8 HOURS PRN
Qty: 15 TAB | Refills: 0 | Status: SHIPPED | OUTPATIENT
Start: 2017-12-01 | End: 2018-05-31

## 2017-12-02 NOTE — PROGRESS NOTES
"Subjective:      Mark Wood is a 37 y.o. male who presents with Suture / Staple Removal            HPI Mark Wood Is a patient of Dr. Martinez here today for suture removal and wound check.    Patient went to the emergency room on 11/19/17 after accidentally lacerating his left thumb while carving a piece of wood at home with his knife. He subsequently received 5 sutures and was placed on Keflex. He admits that he has not been taking his antibiotics correctly. There is pain in the thumb area although he states he has fairly good range of motion. The knife did cut the nail as well. He states there has been no bleeding from the suture site. He denies fever or chills.        Social History   Substance Use Topics   • Smoking status: Never Smoker   • Smokeless tobacco: Never Used   • Alcohol use 0.0 oz/week      Comment: RARELY     Current Outpatient Prescriptions   Medication Sig Dispense Refill   • cephALEXin (KEFLEX) 500 MG Cap Take 1 Cap by mouth 4 times a day for 5 days. 20 Cap 0   • hydrocodone-acetaminophen (NORCO) 5-325 MG Tab per tablet Take 1 Tab by mouth every 8 hours as needed. 15 Tab 0     No current facility-administered medications for this visit.      Family History   Problem Relation Age of Onset   • Diabetes Mother    • Cancer Father      Past Medical History:   Diagnosis Date   • Hyperlipidemia        Review of Systems   Skin: Positive for rash.   All other systems reviewed and are negative.         Objective:     /84   Pulse 61   Temp 36.8 °C (98.2 °F)   Resp 16   Ht 1.651 m (5' 5\")   Wt 78.5 kg (173 lb)   SpO2 96%   BMI 28.79 kg/m²      Physical Exam   Constitutional: He is oriented to person, place, and time. He appears well-developed and well-nourished. No distress.   HENT:   Head: Normocephalic and atraumatic.   Right Ear: External ear normal.   Left Ear: External ear normal.   Nose: Nose normal.   Mouth/Throat: Oropharynx is clear and moist. "   Eyes: Conjunctivae are normal. Right eye exhibits no discharge. Left eye exhibits no discharge.   Neck: Normal range of motion. Neck supple. No tracheal deviation present. No thyromegaly present.   Cardiovascular: Normal rate, regular rhythm and normal heart sounds.    No murmur heard.  Pulmonary/Chest: Effort normal and breath sounds normal. No respiratory distress. He has no wheezes. He has no rales.   Lymphadenopathy:     He has no cervical adenopathy.   Neurological: He is alert and oriented to person, place, and time. Coordination normal.   Skin: Skin is warm and dry. No rash noted. He is not diaphoretic. No erythema.   5 sutures in place over left thumb laceration with much scabbing but no erythema or discharge. The nail is lacerated. He has limited range of motion currently.   Psychiatric: He has a normal mood and affect. His behavior is normal. Judgment and thought content normal.   Nursing note and vitals reviewed.              Assessment/Plan:     1. Laceration of left thumb without foreign body with damage to nail, subsequent encounter  The 5 sutures were removed with some difficulty because they were attached to the scabs but there was minimal bleeding afterwards and a dressing was applied. Patient instructed in care of the site. I explained about the need for taking his antibiotics and a additional 5 days were provided since he has not been taking them correctly. He was also given short course of pain medication because it was uncomfortable for him during the procedure.  - hydrocodone-acetaminophen (NORCO) 5-325 MG Tab per tablet; Take 1 Tab by mouth every 8 hours as needed.  Dispense: 15 Tab; Refill: 0    2. Visit for suture removal  Patient instructed in care of site and signs and symptoms of infection to be aware of and to report immediately.

## 2018-05-30 ENCOUNTER — OFFICE VISIT (OUTPATIENT)
Dept: MEDICAL GROUP | Facility: MEDICAL CENTER | Age: 38
End: 2018-05-30
Payer: COMMERCIAL

## 2018-05-30 VITALS
HEART RATE: 67 BPM | WEIGHT: 177.6 LBS | TEMPERATURE: 97.9 F | OXYGEN SATURATION: 100 % | HEIGHT: 65 IN | RESPIRATION RATE: 15 BRPM | BODY MASS INDEX: 29.59 KG/M2 | SYSTOLIC BLOOD PRESSURE: 122 MMHG | DIASTOLIC BLOOD PRESSURE: 80 MMHG

## 2018-05-30 DIAGNOSIS — M67.432 GANGLION CYST OF VOLAR ASPECT OF LEFT WRIST: ICD-10-CM

## 2018-05-30 PROCEDURE — 99213 OFFICE O/P EST LOW 20 MIN: CPT | Performed by: NURSE PRACTITIONER

## 2018-05-30 ASSESSMENT — PAIN SCALES - GENERAL: PAINLEVEL: 4=SLIGHT-MODERATE PAIN

## 2018-05-30 ASSESSMENT — PATIENT HEALTH QUESTIONNAIRE - PHQ9: CLINICAL INTERPRETATION OF PHQ2 SCORE: 0

## 2018-05-30 NOTE — PROGRESS NOTES
"cc:  Left wrist pain    Subjective:     HPI:     Mark Wood is a 37 y.o. male here to discuss the evaluation and management of:    1. Ganglion cyst of volar aspect of left wrist.  States that he has had left wrist pain for about 4 days now. Does not recall a specific injury or trauma associated with this pain. Has bump on inner wrist that started since Saturday. Was carrying some heavy equipment that day and states a little sore. Has tried some Advil and it helped with pain. Has not happened before.        ROS:  Denies any Headache, Blurred Vision, Confusion Chest pain,  Shortness of breath,  Abdominal pain, Changes of bowel or bladder, Lower ext edema, Fevers, Nights sweats, Weight Changes, Focal weakness or numbness.  All other systems are negative.left wrist pain with bump on left wrist      No current outpatient prescriptions on file.    No Known Allergies    Past Medical History:   Diagnosis Date   • Hyperlipidemia      History reviewed. No pertinent surgical history.  Family History   Problem Relation Age of Onset   • Diabetes Mother    • Cancer Father      Social History     Social History   • Marital status:      Spouse name: N/A   • Number of children: N/A   • Years of education: N/A     Occupational History   • Not on file.     Social History Main Topics   • Smoking status: Never Smoker   • Smokeless tobacco: Never Used   • Alcohol use 0.0 oz/week      Comment: RARELY   • Drug use: No   • Sexual activity: Yes     Other Topics Concern   • Not on file     Social History Narrative   • No narrative on file       Objective:     Vitals: /80   Pulse 67   Temp 36.6 °C (97.9 °F)   Resp 15   Ht 1.651 m (5' 5\")   Wt 80.6 kg (177 lb 9.6 oz)   SpO2 100%   BMI 29.55 kg/m²    General: Alert, pleasant, NAD  HEENT: Normocephalic.  Heart: Regular rate and rhythm.  S1 and S2 normal.  No murmurs appreciated.  Respiratory: Normal respiratory effort.  Clear to auscultation " bilaterally..  Skin: Warm, dry, no rashes.  Musculoskeletal: Gait is normal.  Moves all extremities well.  Extremities: No leg edema. Volar aspect of left wrist with small cyst like bump present, soft  Neurological: No tremors, sensation grossly intact  Psych:  Affect/mood is normal, judgement is good, memory is intact, grooming is appropriate.    Assessment/Plan:     Mark was seen today for wrist pain.    Diagnoses and all orders for this visit:    Ganglion cyst of volar aspect of left wrist  Small ganglion cyst on left wrist. No numbness/tingling in fingers, mild discomfort. Handout provided on cyst. Advised patient to use NSAIDs for discomfort, apply ice and when working use a wrist brace to help support and relive discomfort. Advised him he does not need it to be drained at this time and he should not attempt to drain it. Follow up if symptoms persist or worsen.        Health care Maintenance:     Return if symptoms worsen or fail to improve.          Laney VAZ

## 2018-05-30 NOTE — PATIENT INSTRUCTIONS
Ganglion Cyst  Introduction  A ganglion cyst is a noncancerous, fluid-filled lump that occurs near joints or tendons. The ganglion cyst grows out of a joint or the lining of a tendon. It most often develops in the hand or wrist, but it can also develop in the shoulder, elbow, hip, knee, ankle, or foot. The round or oval ganglion cyst can be the size of a pea or larger than a grape. Increased activity may enlarge the size of the cyst because more fluid starts to build up.  What are the causes?  It is not known what causes a ganglion cyst to grow. However, it may be related to:  · Inflammation or irritation around the joint.  · An injury.  · Repetitive movements or overuse.  · Arthritis.  What increases the risk?  Risk factors include:  · Being a woman.  · Being age 20-50.  What are the signs or symptoms?  Symptoms may include:  · A lump. This most often appears on the hand or wrist, but it can occur in other areas of the body.  · Tingling.  · Pain.  · Numbness.  · Muscle weakness.  · Weak .  · Less movement in a joint.  How is this diagnosed?  Ganglion cysts are most often diagnosed based on a physical exam. Your health care provider will feel the lump and may shine a light alongside it. If it is a ganglion cyst, a light often shines through it. Your health care provider may order an X-ray, ultrasound, or MRI to rule out other conditions.  How is this treated?  Ganglion cysts usually go away on their own without treatment. If pain or other symptoms are involved, treatment may be needed. Treatment is also needed if the ganglion cyst limits your movement or if it gets infected. Treatment may include:  · Wearing a brace or splint on your wrist or finger.  · Taking anti-inflammatory medicine.  · Draining fluid from the lump with a needle (aspiration).  · Injecting a steroid into the joint.  · Surgery to remove the ganglion cyst.  Follow these instructions at home:  · Do not press on the ganglion cyst, poke it with a  needle, or hit it.  · Take medicines only as directed by your health care provider.  · Wear your brace or splint as directed by your health care provider.  · Watch your ganglion cyst for any changes.  · Keep all follow-up visits as directed by your health care provider. This is important.  Contact a health care provider if:  · Your ganglion cyst becomes larger or more painful.  · You have increased redness, red streaks, or swelling.  · You have pus coming from the lump.  · You have weakness or numbness in the affected area.  · You have a fever or chills.  This information is not intended to replace advice given to you by your health care provider. Make sure you discuss any questions you have with your health care provider.  Document Released: 12/15/2001 Document Revised: 05/25/2017 Document Reviewed: 06/02/2015  © 2017 [employer]vier

## 2018-07-02 ENCOUNTER — OFFICE VISIT (OUTPATIENT)
Dept: MEDICAL GROUP | Facility: MEDICAL CENTER | Age: 38
End: 2018-07-02
Payer: COMMERCIAL

## 2018-07-02 VITALS
DIASTOLIC BLOOD PRESSURE: 68 MMHG | HEIGHT: 65 IN | RESPIRATION RATE: 16 BRPM | SYSTOLIC BLOOD PRESSURE: 126 MMHG | BODY MASS INDEX: 28.49 KG/M2 | OXYGEN SATURATION: 100 % | HEART RATE: 66 BPM | WEIGHT: 171 LBS | TEMPERATURE: 98.3 F

## 2018-07-02 DIAGNOSIS — Z00.00 WELLNESS EXAMINATION: ICD-10-CM

## 2018-07-02 DIAGNOSIS — R79.89 LFTS ABNORMAL: ICD-10-CM

## 2018-07-02 DIAGNOSIS — E78.5 DYSLIPIDEMIA: ICD-10-CM

## 2018-07-02 PROCEDURE — 99395 PREV VISIT EST AGE 18-39: CPT | Performed by: INTERNAL MEDICINE

## 2018-07-02 NOTE — PROGRESS NOTES
"CC: Wellness examination    HPI:   Mark presents today with the following.    1. Wellness examination  Presents for wellness examination.  He was recently seen for a ganglion cyst which has resolved.  He was being followed approximately year and a half ago for an abnormal LFT and high cholesterol.  He is overdue for recheck.  He is remaining physically active denies any physical complaints today.  He has no chest pain or shortness breath no edema no other complaints today.  He reports he is no longer drinking as much as he used to may be having 1 drink per week and now.          Patient Active Problem List    Diagnosis Date Noted   • LFTs abnormal 03/30/2017   • Dyslipidemia 02/01/2017       No current outpatient prescriptions on file.     No current facility-administered medications for this visit.          Allergies as of 07/02/2018   • (No Known Allergies)        ROS: Denies Chest pain, SOB, LE edema.    /68   Pulse 66   Temp 36.8 °C (98.3 °F)   Resp 16   Ht 1.651 m (5' 5\")   Wt 77.6 kg (171 lb)   SpO2 100%   BMI 28.46 kg/m²     Physical Exam:  Gen:         Alert and oriented, No apparent distress.  Neck:        No Lymphadenopathy or Bruits.  Lungs:     Clear to auscultation bilaterally  CV:          Regular rate and rhythm. No murmurs, rubs or gallops.               Ext:          No clubbing, cyanosis, edema.      Assessment and Plan.   37 y.o. male with the following issues.    1. Wellness examination  Discussed healthy lifestyle habits as well as screening regimens.  Sending for blood work        "

## 2018-07-18 ENCOUNTER — HOSPITAL ENCOUNTER (OUTPATIENT)
Dept: LAB | Facility: MEDICAL CENTER | Age: 38
End: 2018-07-18
Attending: INTERNAL MEDICINE
Payer: COMMERCIAL

## 2018-07-18 DIAGNOSIS — E78.5 DYSLIPIDEMIA: ICD-10-CM

## 2018-07-18 DIAGNOSIS — R79.89 LFTS ABNORMAL: ICD-10-CM

## 2018-07-18 LAB
ALBUMIN SERPL BCP-MCNC: 4.7 G/DL (ref 3.2–4.9)
ALBUMIN/GLOB SERPL: 1.9 G/DL
ALP SERPL-CCNC: 70 U/L (ref 30–99)
ALT SERPL-CCNC: 69 U/L (ref 2–50)
ANION GAP SERPL CALC-SCNC: 9 MMOL/L (ref 0–11.9)
AST SERPL-CCNC: 33 U/L (ref 12–45)
BILIRUB SERPL-MCNC: 0.7 MG/DL (ref 0.1–1.5)
BUN SERPL-MCNC: 15 MG/DL (ref 8–22)
CALCIUM SERPL-MCNC: 9.5 MG/DL (ref 8.5–10.5)
CHLORIDE SERPL-SCNC: 101 MMOL/L (ref 96–112)
CHOLEST SERPL-MCNC: 241 MG/DL (ref 100–199)
CO2 SERPL-SCNC: 28 MMOL/L (ref 20–33)
CREAT SERPL-MCNC: 0.89 MG/DL (ref 0.5–1.4)
GLOBULIN SER CALC-MCNC: 2.5 G/DL (ref 1.9–3.5)
GLUCOSE SERPL-MCNC: 85 MG/DL (ref 65–99)
HBV CORE IGM SER QL: NEGATIVE
HBV SURFACE AB SERPL IA-ACNC: <3.1 MIU/ML (ref 0–10)
HBV SURFACE AG SER QL: NEGATIVE
HCV AB SER QL: NEGATIVE
HDLC SERPL-MCNC: 40 MG/DL
LDLC SERPL CALC-MCNC: ABNORMAL MG/DL
POTASSIUM SERPL-SCNC: 3.9 MMOL/L (ref 3.6–5.5)
PROT SERPL-MCNC: 7.2 G/DL (ref 6–8.2)
SODIUM SERPL-SCNC: 138 MMOL/L (ref 135–145)
TRIGL SERPL-MCNC: 471 MG/DL (ref 0–149)

## 2018-07-18 PROCEDURE — 36415 COLL VENOUS BLD VENIPUNCTURE: CPT

## 2018-07-18 PROCEDURE — 80053 COMPREHEN METABOLIC PANEL: CPT

## 2018-07-18 PROCEDURE — 86705 HEP B CORE ANTIBODY IGM: CPT

## 2018-07-18 PROCEDURE — 80061 LIPID PANEL: CPT

## 2018-07-18 PROCEDURE — 86803 HEPATITIS C AB TEST: CPT

## 2018-07-18 PROCEDURE — 87340 HEPATITIS B SURFACE AG IA: CPT

## 2018-07-18 PROCEDURE — 86706 HEP B SURFACE ANTIBODY: CPT

## 2019-10-29 ENCOUNTER — APPOINTMENT (OUTPATIENT)
Dept: MEDICAL GROUP | Facility: MEDICAL CENTER | Age: 39
End: 2019-10-29
Payer: COMMERCIAL

## 2019-11-01 ENCOUNTER — OFFICE VISIT (OUTPATIENT)
Dept: MEDICAL GROUP | Facility: MEDICAL CENTER | Age: 39
End: 2019-11-01
Payer: COMMERCIAL

## 2019-11-01 ENCOUNTER — HOSPITAL ENCOUNTER (OUTPATIENT)
Dept: RADIOLOGY | Facility: MEDICAL CENTER | Age: 39
End: 2019-11-01
Attending: INTERNAL MEDICINE
Payer: COMMERCIAL

## 2019-11-01 VITALS
OXYGEN SATURATION: 94 % | HEIGHT: 65 IN | SYSTOLIC BLOOD PRESSURE: 118 MMHG | WEIGHT: 178 LBS | HEART RATE: 78 BPM | DIASTOLIC BLOOD PRESSURE: 68 MMHG | BODY MASS INDEX: 29.66 KG/M2 | TEMPERATURE: 98.1 F

## 2019-11-01 DIAGNOSIS — M25.572 ACUTE LEFT ANKLE PAIN: ICD-10-CM

## 2019-11-01 DIAGNOSIS — Z23 NEED FOR VACCINATION: ICD-10-CM

## 2019-11-01 PROCEDURE — 99214 OFFICE O/P EST MOD 30 MIN: CPT | Mod: 25 | Performed by: INTERNAL MEDICINE

## 2019-11-01 PROCEDURE — 73600 X-RAY EXAM OF ANKLE: CPT | Mod: LT

## 2019-11-01 PROCEDURE — 90686 IIV4 VACC NO PRSV 0.5 ML IM: CPT | Performed by: INTERNAL MEDICINE

## 2019-11-01 PROCEDURE — 90471 IMMUNIZATION ADMIN: CPT | Performed by: INTERNAL MEDICINE

## 2019-11-01 ASSESSMENT — PATIENT HEALTH QUESTIONNAIRE - PHQ9: CLINICAL INTERPRETATION OF PHQ2 SCORE: 0

## 2019-11-01 NOTE — PROGRESS NOTES
"      CC: Ankle pain                                                                                                                                      HPI:   Mark presents today with the following.    1. Acute left ankle pain  Presents after kicking something at work injuring his ankle.  He does have pain and swelling over the joint line.  He reports it initially swell incredibly.  Pain was 8 out of 10 in the last week now down to 5 out of 10 and swelling is almost resolved.  He did have some initial redness which is also getting better.      Patient Active Problem List    Diagnosis Date Noted   • LFTs abnormal 03/30/2017   • Dyslipidemia 02/01/2017       No current outpatient medications on file.     No current facility-administered medications for this visit.          Allergies as of 11/01/2019   • (No Known Allergies)        ROS: Denies Chest pain, SOB, LE edema.    /68 (BP Location: Right arm, Patient Position: Sitting)   Pulse 78   Temp 36.7 °C (98.1 °F)   Ht 1.651 m (5' 5\")   Wt 80.7 kg (178 lb)   SpO2 94%   BMI 29.62 kg/m²     Physical Exam:  Gen:         Alert and oriented, No apparent distress.  Neck:        No Lymphadenopathy or Bruits.  Lungs:     Clear to auscultation bilaterally  CV:          Regular rate and rhythm. No murmurs, rubs or gallops.               Ext:          No clubbing, cyanosis, edema.      Assessment and Plan.   39 y.o. male with the following issues.    1. Acute left ankle pain  Full range of motion no significant swelling given the duration of pain still significant have written for x-ray.  Recommended rest ice and over-the-counter bracing will await results of x-ray  - DX-ANKLE 2- VIEWS LEFT; Future    2. Need for vaccination    - Influenza Vaccine Quad Injection (PF)      "

## 2021-08-25 ENCOUNTER — OFFICE VISIT (OUTPATIENT)
Dept: MEDICAL GROUP | Facility: PHYSICIAN GROUP | Age: 41
End: 2021-08-25
Payer: COMMERCIAL

## 2021-08-25 VITALS
BODY MASS INDEX: 28.82 KG/M2 | WEIGHT: 173 LBS | OXYGEN SATURATION: 96 % | HEIGHT: 65 IN | DIASTOLIC BLOOD PRESSURE: 60 MMHG | SYSTOLIC BLOOD PRESSURE: 100 MMHG | HEART RATE: 85 BPM | TEMPERATURE: 98.9 F

## 2021-08-25 DIAGNOSIS — Z13.228 SCREENING FOR METABOLIC DISORDER: ICD-10-CM

## 2021-08-25 DIAGNOSIS — E78.5 DYSLIPIDEMIA: ICD-10-CM

## 2021-08-25 DIAGNOSIS — R79.89 LFTS ABNORMAL: ICD-10-CM

## 2021-08-25 PROBLEM — Z00.00 HEALTHCARE MAINTENANCE: Status: ACTIVE | Noted: 2021-08-25

## 2021-08-25 PROBLEM — K76.0 FATTY LIVER: Status: ACTIVE | Noted: 2021-08-25

## 2021-08-25 PROCEDURE — 99214 OFFICE O/P EST MOD 30 MIN: CPT | Performed by: STUDENT IN AN ORGANIZED HEALTH CARE EDUCATION/TRAINING PROGRAM

## 2021-08-25 ASSESSMENT — PATIENT HEALTH QUESTIONNAIRE - PHQ9: CLINICAL INTERPRETATION OF PHQ2 SCORE: 0

## 2021-08-25 NOTE — ASSESSMENT & PLAN NOTE
Prior hx of elevated ALT   No recent labs.   Old labs with negative hep panel.   Prior US (2017) with findings for fatty-liver.   Denies any RUQ pains.   Only mild alcohol use.   Has been using Tylenol about weekly.   - stop tylenol use (or very minimal use if needed).   - will get new labs.   - follow-up in 1-2 weeks.

## 2021-08-25 NOTE — PROGRESS NOTES
"New to Provider  (Prev.Established [facility])      Mark Muniz is a 40 y.o. male.    Reason for visit: PCP switch      Chief Complaint   Patient presents with   • Establish Care   • Orders Needed     requesting labs      -  Prior PCP:  Giovani ortiz ([facility]).  (2 years ago).             Problems discussed this visit:    This note uses problem-based documentation.  Subjective HPI is included in Assessment & Plan, at bottom of note.   Problem   Fatty Liver   Lfts Abnormal   Dyslipidemia                          Past Medical History:   Diagnosis Date   • Elevated LFTs    • Hyperlipidemia        History reviewed. No pertinent surgical history.    Family History   Problem Relation Age of Onset   • Diabetes Mother    • Cancer Father         esophageal ca       Social History     Tobacco Use   • Smoking status: Never Smoker   • Smokeless tobacco: Never Used   Substance Use Topics   • Alcohol use: Yes     Comment: drinks 6-10 beers on the weekend (advised to decrease).        Current medications:  (including new changes):  No current outpatient medications on file.     No current facility-administered medications for this visit.       Allergies as of 08/25/2021   • (No Known Allergies)                    Review of Symptoms  (as noted elsewhere, and .....)    GEN/CONST:   Denies fever, chills,      + fatigue (after work, not during the day).     CARDIO:   Denies chest pain, palpitations, or edema.    RESP:   Denies shortness of breath, wheezing, or coughing.  GI:    Denies nausea, vomiting, diarrhea, constipation    :   Denies dysuria, hematuria,     MSK:  Denies joint pains  or muscle aches.    NEURO:  Denies numbness or focal weakness (unless laying on arm).           Physical Exam  /60   Pulse 85   Temp 37.2 °C (98.9 °F) (Temporal)   Ht 1.651 m (5' 5\")   Wt 78.5 kg (173 lb)   SpO2 96%   BMI 28.79 kg/m²   General:  Alert and oriented, No apparent distress.  Eyes:  PER   " EOMI.  No scleral icterus.    Neck: Supple. No lymphadenopathy noted. Thyroid not enlarged.   Lungs: Clear to auscultation bilaterally.  No wheezes or rales.     Cardiovascular: Regular rate and rhythm.  No murmurs, or gallops.  Abdomen:  Soft.  Non-distended.  Non-tender.     Extremities: No pedal edema.  Good general motion of extremities.       + varicosity on left lower leg.   Psychological: Appears to have normal mood and affect.        Labs:  Prior labs reviewed.  (OLD - CBC, CMP, Lipids).      - new labs ordered.                  Health Maintenance Review     influ vaccine - n/a  Pneumo Vaccine - n/a  Tetanus - 11/2017   Shingles, Colonoscopy, - n/a  Dexa, Hep.C.screen, PSA - n/a   Labs < 12 mo / met screen - ordered....                               Assessment & Plan  (with subjective history and orders):  Of note, the list below is not in a specific nor sortable order.      Problem List Items Addressed This Visit     Dyslipidemia     Prior hx of elevated TG.  Denies:  angina, palpitations,   Not on any medications, right now.   - will get new labs, to evaluate.  - return in 1-2 weeks.          Relevant Orders    Comp Metabolic Panel    Lipid Profile    HEMOGLOBIN A1C    TSH WITH REFLEX TO FT4    LFTs abnormal     Prior hx of elevated ALT   No recent labs.   Old labs with negative hep panel.   Prior US (2017) with findings for fatty-liver.   Denies any RUQ pains.   Only mild alcohol use.   Has been using Tylenol about weekly.   - stop tylenol use (or very minimal use if needed).   - will get new labs.   - follow-up in 1-2 weeks.          Relevant Orders    Comp Metabolic Panel    Lipid Profile    TSH WITH REFLEX TO FT4      Other Visit Diagnoses     Screening for metabolic disorder        Relevant Orders    CBC WITH DIFFERENTIAL    Comp Metabolic Panel    Lipid Profile    HEMOGLOBIN A1C    TSH WITH REFLEX TO FT4        Of note, the above list is not in a specific nor sortable order.                   Diagnosis Table, with orders:  1. Dyslipidemia  Comp Metabolic Panel    Lipid Profile    HEMOGLOBIN A1C    TSH WITH REFLEX TO FT4   2. LFTs abnormal  Comp Metabolic Panel    Lipid Profile    TSH WITH REFLEX TO FT4   3. Screening for metabolic disorder  CBC WITH DIFFERENTIAL    Comp Metabolic Panel    Lipid Profile    HEMOGLOBIN A1C    TSH WITH REFLEX TO FT4                 Follow-up:  1-2 weeks  (DLD, LFTs, Labs).                 A computerized dictation system may have been used in this note.    Despite review, there may be some errors in spelling or grammar.    Radames Bruner M.D.  8/25/2021

## 2021-08-25 NOTE — ASSESSMENT & PLAN NOTE
influ vaccine - n/a  Pneumo Vaccine - n/a  Tetanus - 11/2017   Shingles, Colonoscopy, - n/a  Dexa, Hep.C.screen, PSA - n/a   Labs < 12 mo / met screen - ordered....

## 2021-08-25 NOTE — PATIENT INSTRUCTIONS
Please get the labs done in the next few days.    Please get them done while fasting   (no food, coffee, or juices, for 8 hours - but water is ok).     Return 1-2 weeks, to review labs, and decide on treatment.   Thank you.

## 2021-08-25 NOTE — ASSESSMENT & PLAN NOTE
Prior hx of elevated TG.  Denies:  angina, palpitations,   Not on any medications, right now.   - will get new labs, to evaluate.  - return in 1-2 weeks.

## 2021-08-26 ENCOUNTER — HOSPITAL ENCOUNTER (OUTPATIENT)
Dept: LAB | Facility: MEDICAL CENTER | Age: 41
End: 2021-08-26
Attending: STUDENT IN AN ORGANIZED HEALTH CARE EDUCATION/TRAINING PROGRAM
Payer: COMMERCIAL

## 2021-08-26 DIAGNOSIS — Z13.228 SCREENING FOR METABOLIC DISORDER: ICD-10-CM

## 2021-08-26 DIAGNOSIS — E78.5 DYSLIPIDEMIA: ICD-10-CM

## 2021-08-26 DIAGNOSIS — R79.89 LFTS ABNORMAL: ICD-10-CM

## 2021-08-26 LAB
ALBUMIN SERPL BCP-MCNC: 4.7 G/DL (ref 3.2–4.9)
ALBUMIN/GLOB SERPL: 1.7 G/DL
ALP SERPL-CCNC: 75 U/L (ref 30–99)
ALT SERPL-CCNC: 63 U/L (ref 2–50)
ANION GAP SERPL CALC-SCNC: 10 MMOL/L (ref 7–16)
AST SERPL-CCNC: 35 U/L (ref 12–45)
BASOPHILS # BLD AUTO: 0.2 % (ref 0–1.8)
BASOPHILS # BLD: 0.01 K/UL (ref 0–0.12)
BILIRUB SERPL-MCNC: 0.6 MG/DL (ref 0.1–1.5)
BUN SERPL-MCNC: 19 MG/DL (ref 8–22)
CALCIUM SERPL-MCNC: 9.1 MG/DL (ref 8.5–10.5)
CHLORIDE SERPL-SCNC: 104 MMOL/L (ref 96–112)
CHOLEST SERPL-MCNC: 252 MG/DL (ref 100–199)
CO2 SERPL-SCNC: 26 MMOL/L (ref 20–33)
CREAT SERPL-MCNC: 0.8 MG/DL (ref 0.5–1.4)
EOSINOPHIL # BLD AUTO: 0.07 K/UL (ref 0–0.51)
EOSINOPHIL NFR BLD: 1.7 % (ref 0–6.9)
ERYTHROCYTE [DISTWIDTH] IN BLOOD BY AUTOMATED COUNT: 40.2 FL (ref 35.9–50)
EST. AVERAGE GLUCOSE BLD GHB EST-MCNC: 108 MG/DL
FASTING STATUS PATIENT QL REPORTED: NORMAL
GLOBULIN SER CALC-MCNC: 2.8 G/DL (ref 1.9–3.5)
GLUCOSE SERPL-MCNC: 85 MG/DL (ref 65–99)
HBA1C MFR BLD: 5.4 % (ref 4–5.6)
HCT VFR BLD AUTO: 51.3 % (ref 42–52)
HDLC SERPL-MCNC: 35 MG/DL
HGB BLD-MCNC: 17.2 G/DL (ref 14–18)
IMM GRANULOCYTES # BLD AUTO: 0.01 K/UL (ref 0–0.11)
IMM GRANULOCYTES NFR BLD AUTO: 0.2 % (ref 0–0.9)
LDLC SERPL CALC-MCNC: 161 MG/DL
LYMPHOCYTES # BLD AUTO: 1.77 K/UL (ref 1–4.8)
LYMPHOCYTES NFR BLD: 42.1 % (ref 22–41)
MCH RBC QN AUTO: 29.7 PG (ref 27–33)
MCHC RBC AUTO-ENTMCNC: 33.5 G/DL (ref 33.7–35.3)
MCV RBC AUTO: 88.6 FL (ref 81.4–97.8)
MONOCYTES # BLD AUTO: 0.32 K/UL (ref 0–0.85)
MONOCYTES NFR BLD AUTO: 7.6 % (ref 0–13.4)
NEUTROPHILS # BLD AUTO: 2.02 K/UL (ref 1.82–7.42)
NEUTROPHILS NFR BLD: 48.2 % (ref 44–72)
NRBC # BLD AUTO: 0 K/UL
NRBC BLD-RTO: 0 /100 WBC
PLATELET # BLD AUTO: 242 K/UL (ref 164–446)
PMV BLD AUTO: 10.3 FL (ref 9–12.9)
POTASSIUM SERPL-SCNC: 4.2 MMOL/L (ref 3.6–5.5)
PROT SERPL-MCNC: 7.5 G/DL (ref 6–8.2)
RBC # BLD AUTO: 5.79 M/UL (ref 4.7–6.1)
SODIUM SERPL-SCNC: 140 MMOL/L (ref 135–145)
TRIGL SERPL-MCNC: 278 MG/DL (ref 0–149)
TSH SERPL DL<=0.005 MIU/L-ACNC: 1.89 UIU/ML (ref 0.38–5.33)
WBC # BLD AUTO: 4.2 K/UL (ref 4.8–10.8)

## 2021-08-26 PROCEDURE — 80061 LIPID PANEL: CPT

## 2021-08-26 PROCEDURE — 85025 COMPLETE CBC W/AUTO DIFF WBC: CPT

## 2021-08-26 PROCEDURE — 84443 ASSAY THYROID STIM HORMONE: CPT

## 2021-08-26 PROCEDURE — 36415 COLL VENOUS BLD VENIPUNCTURE: CPT

## 2021-08-26 PROCEDURE — 80053 COMPREHEN METABOLIC PANEL: CPT

## 2021-08-26 PROCEDURE — 83036 HEMOGLOBIN GLYCOSYLATED A1C: CPT

## 2022-01-19 NOTE — DISCHARGE INSTRUCTIONS
Assessment:     Healthy 11 y o  male child  1  Health check for child over 34 days old     2  Encounter for immunization  influenza vaccine, quadrivalent, 0 5 mL, preservative-free, for adult and pediatric patients 6 mos+ (AFLURIA, FLUARIX, FLULAVAL, FLUZONE)   3  Body mass index, pediatric, 85th percentile to less than 95th percentile for age     3  Exercise counseling     5  Nutritional counseling     6  Food protein induced enterocolitis syndrome (FPIES)  Ambulatory referral to Pediatric Gastroenterology    Ambulatory Referral to Allergy   7  Multiple food allergies  Ambulatory Referral to Allergy       Plan:         1  Anticipatory guidance discussed  Gave handout on well-child issues at this age  Specific topics reviewed: bicycle helmets, car seat/seat belts; don't put in front seat, caution with possible poisons (including pills, plants, cosmetics), chores and other responsibilities, discipline issues: limit-setting, positive reinforcement, importance of regular dental care, importance of varied diet, minimize junk food, read together; [place] [address] card; limit TV, media violence, safe storage of any firearms in the home, school preparation, skim or lowfat milk and smoke detectors; home fire drills  Nutrition and Exercise Counseling: The patient's Body mass index is 16 86 kg/m²  This is 85 %ile (Z= 1 05) based on CDC (Boys, 2-20 Years) BMI-for-age based on BMI available as of 1/19/2022  Nutrition counseling provided:  Avoid juice/sugary drinks  Anticipatory guidance for nutrition given and counseled on healthy eating habits  5 servings of fruits/vegetables  Exercise counseling provided:  Anticipatory guidance and counseling on exercise and physical activity given  Reduce screen time to less than 2 hours per day  1 hour of aerobic exercise daily  Reviewed long term health goals and risks of obesity  2  Development: appropriate for age    1   Immunizations today: mother declined flu Sutured Wound Care  Sutures are stitches that can be used to close wounds. Taking care of your wound properly can help to prevent pain and infection. It can also help your wound to heal more quickly.  HOW TO CARE FOR YOUR SUTURED WOUND  Wound Care  · Keep the wound clean and dry.  · If you were given a bandage (dressing), you should change it at least once per day or as directed by your health care provider. You should also change it if it becomes wet or dirty.  · Keep the wound completely dry for the first 24 hours or as directed by your health care provider. After that time, you may shower or bathe. However, make sure that the wound is not soaked in water until the sutures have been removed.  · Clean the wound one time each day or as directed by your health care provider.  ¨ Wash the wound with soap and water.  ¨ Rinse the wound with water to remove all soap.  ¨ Pat the wound dry with a clean towel. Do not rub the wound.  · After cleaning the wound, apply a thin layer of antibiotic ointment as directed by your health care provider. This will help to prevent infection and keep the dressing from sticking to the wound.  · Have the sutures removed as directed by your health care provider.  General Instructions  · Take or apply medicines only as directed by your health care provider.  · To help prevent scarring, make sure to cover your wound with sunscreen whenever you are outside after the sutures are removed and the wound is healed. Make sure to wear a sunscreen of at least 30 SPF.  · If you were prescribed an antibiotic medicine or ointment, finish all of it even if you start to feel better.  · Do not scratch or pick at the wound.  · Keep all follow-up visits as directed by your health care provider. This is important.  · Check your wound every day for signs of infection. Watch for:    ¨ Redness, swelling, or pain.  ¨ Fluid, blood, or pus.  · Raise (elevate) the injured area above the level of your heart while you  are sitting or lying down, if possible.  · Avoid stretching your wound.  · Drink enough fluids to keep your urine clear or pale yellow.  SEEK MEDICAL CARE IF:  · You received a tetanus shot and you have swelling, severe pain, redness, or bleeding at the injection site.  · You have a fever.  · A wound that was closed breaks open.  · You notice a bad smell coming from the wound.  · You notice something coming out of the wound, such as wood or glass.  · Your pain is not controlled with medicine.  · You have increased redness, swelling, or pain at the site of your wound.  · You have fluid, blood, or pus coming from your wound.  · You notice a change in the color of your skin near your wound.  · You need to change the dressing frequently due to fluid, blood, or pus draining from the wound.  · You develop a new rash.  · You develop numbness around the wound.  SEEK IMMEDIATE MEDICAL CARE IF:  · You develop severe swelling around the injury site.  · Your pain suddenly increases and is severe.  · You develop painful lumps near the wound or on skin that is anywhere on your body.  · You have a red streak going away from your wound.  · The wound is on your hand or foot and you cannot properly move a finger or toe.  · The wound is on your hand or foot and you notice that your fingers or toes look pale or bluish.     This information is not intended to replace advice given to you by your health care provider. Make sure you discuss any questions you have with your health care provider.     Document Released: 01/25/2006 Document Revised: 05/03/2016 Document Reviewed: 12/14/2015  [employer] Interactive Patient Education ©2016 [employer]     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 was an infant  Peanuts: caused itchy tongue  Has EpiPen  Will be starting K in the fall; mom is concerned about his allergies and wants to go back to local allergist and GI to "see what is safe for him to have" before he starts school     Mom is pregnant and due in July     He has never been to day care or preK; but can count over 20, can print letters and his name; recognizes letters and numbers and is social     Current concerns include sensitive to sounds; holds his ears when they vacuum or flush the toilet  Has always been that way  Used ventolin with pneumonia 2 years ago but never dx with asthma or RAD; has not needed it any other time     Well Child Assessment:  History was provided by the mother and grandmother  Lena Benton lives with his mother and father  Nutrition  Types of intake include cow's milk, meats, vegetables, fruits and juices (avoid all nuts, hasn't tried fish)  Dental  The patient has a dental home  The patient brushes teeth regularly  Last dental exam was less than 6 months ago  Elimination  Elimination problems do not include constipation or diarrhea  Toilet training is complete  Sleep  Average sleep duration is 10 (sleeps with mom) hours  The patient does not snore  There are no sleep problems     Safety  There is no smoking in the home  Home has working smoke alarms? yes  Home has working carbon monoxide alarms? yes  There is a gun in home (locked and away)  School  There are no signs of learning disabilities  Screening  Immunizations are up-to-date  There are no risk factors for hearing loss  There are no risk factors for anemia  There are no risk factors for tuberculosis  There are no risk factors for lead toxicity  Social  The caregiver enjoys the child  Childcare is provided at child's home  The childcare provider is a parent  The following portions of the patient's history were reviewed and updated as appropriate: He  has a past medical history of Allergic, Allergic reaction, Anemia, Eczema, Enterocolitis, Food protein induced enterocolitis syndrome (FPIES), Food protein induced enterocolitis syndrome (FPIES), Fussy infant, Heme positive stool, Otitis media, Pneumonia, Sleep disturbance, and Slow weight gain in child  He   Patient Active Problem List    Diagnosis Date Noted    Multiple food allergies 01/19/2022    Food protein induced enterocolitis syndrome (FPIES) 09/28/2017    Eczema 05/30/2017     He  has a past surgical history that includes pr reconstruction, tongue fold (N/A, 1/12/2018) and Circumcision  His family history includes Esophagitis in his mother; Hernia in his father; Hypertension in his maternal grandmother  He  reports that he has never smoked  He has never used smokeless tobacco  No history on file for alcohol use and drug use    Current Outpatient Medications   Medication Sig Dispense Refill    EPINEPHrine (EPIPEN JR) 0 15 mg/0 3 mL SOAJ Inject 0 3 mL (0 15 mg total) into a muscle as needed for anaphylaxis for up to 1 dose 1 each 0    albuterol (PROAIR HFA) 90 mcg/act inhaler Inhale 2 puffs every 6 (six) hours as needed for wheezing (Patient not taking: Reported on 1/19/2022 ) 8 5 g 0    cetirizine (ZyrTEC) oral solution Take 5 mL (5 mg total) by mouth daily (Patient not taking: Reported on 1/19/2022 ) 150 mL 2    Spacer/Aero-Holding Chambers (OPTICHAMBER SHOAIB-MD MASK) MISC USE WITH INHALER (Patient not taking: Reported on 1/19/2022)       No current facility-administered medications for this visit  He is allergic to rice - food allergy and peanut oil - food allergy                 Objective:       Growth parameters are noted and are appropriate for age  Wt Readings from Last 1 Encounters:   01/19/22 20 8 kg (45 lb 12 8 oz) (80 %, Z= 0 82)*     * Growth percentiles are based on CDC (Boys, 2-20 Years) data  Ht Readings from Last 1 Encounters:   01/19/22 3' 7 7" (1 11 m) (64 %, Z= 0 36)*     * Growth percentiles are based on CDC (Boys, 2-20 Years) data  Body mass index is 16 86 kg/m²      Vitals:    01/19/22 0830   BP: 100/60   BP Location: Left arm   Patient Position: Sitting   Cuff Size: Child   Weight: 20 8 kg (45 lb 12 8 oz)   Height: 3' 7 7" (1 11 m)        Hearing Screening    125Hz 250Hz 500Hz 1000Hz 2000Hz 3000Hz 4000Hz 6000Hz 8000Hz   Right ear:   20 20 20  20     Left ear:   20 20 20  20        Visual Acuity Screening    Right eye Left eye Both eyes   Without correction: 20/40 20/40    With correction:          Physical Exam  Gen: awake, alert, no noted distress  Head: normocephalic, atraumatic  Ears: canals are b/l without exudate or inflammation; TMs are b/l intact and with present light reflex and landmarks; no noted effusion or erythema  Eyes: pupils are equal, round and reactive to light; conjunctiva are without injection or discharge  Nose: mucous membranes and turbinates are normal; no rhinorrhea; septum is midline  Oropharynx: oral cavity is without lesions, mmm, palate normal; tonsils are symmetric, 2+ and without exudate or edema  Neck: supple, full range of motion  Chest: rate regular, clear to auscultation in all fields  Card: rate and rhythm regular, no murmurs appreciated, femoral pulses are symmetric and strong; well perfused  Abd: flat, soft, normoactive bs throughout, no hepatosplenomegaly appreciated  Musculoskeletal:  Moves all extremities well; no scoliosis  Gen: normal anatomy T1male testes down martin  Skin: no lesions noted; small hypopigmented macule above his umbilicus   Neuro: oriented x 3, no focal deficits noted

## 2022-06-15 ENCOUNTER — HOSPITAL ENCOUNTER (OUTPATIENT)
Facility: MEDICAL CENTER | Age: 42
End: 2022-06-15
Attending: PHYSICIAN ASSISTANT
Payer: COMMERCIAL

## 2022-06-15 ENCOUNTER — OFFICE VISIT (OUTPATIENT)
Dept: URGENT CARE | Facility: PHYSICIAN GROUP | Age: 42
End: 2022-06-15
Payer: COMMERCIAL

## 2022-06-15 VITALS
RESPIRATION RATE: 16 BRPM | TEMPERATURE: 98.3 F | WEIGHT: 175 LBS | OXYGEN SATURATION: 96 % | DIASTOLIC BLOOD PRESSURE: 70 MMHG | HEART RATE: 70 BPM | SYSTOLIC BLOOD PRESSURE: 132 MMHG | BODY MASS INDEX: 29.16 KG/M2 | HEIGHT: 65 IN

## 2022-06-15 DIAGNOSIS — J06.9 UPPER RESPIRATORY TRACT INFECTION, UNSPECIFIED TYPE: ICD-10-CM

## 2022-06-15 LAB
FLUAV+FLUBV AG SPEC QL IA: NEGATIVE
INT CON NEG: NORMAL
INT CON POS: NORMAL

## 2022-06-15 PROCEDURE — 87804 INFLUENZA ASSAY W/OPTIC: CPT | Performed by: PHYSICIAN ASSISTANT

## 2022-06-15 PROCEDURE — 99213 OFFICE O/P EST LOW 20 MIN: CPT | Performed by: PHYSICIAN ASSISTANT

## 2022-06-15 PROCEDURE — U0003 INFECTIOUS AGENT DETECTION BY NUCLEIC ACID (DNA OR RNA); SEVERE ACUTE RESPIRATORY SYNDROME CORONAVIRUS 2 (SARS-COV-2) (CORONAVIRUS DISEASE [COVID-19]), AMPLIFIED PROBE TECHNIQUE, MAKING USE OF HIGH THROUGHPUT TECHNOLOGIES AS DESCRIBED BY CMS-2020-01-R: HCPCS

## 2022-06-15 PROCEDURE — U0005 INFEC AGEN DETEC AMPLI PROBE: HCPCS

## 2022-06-15 RX ORDER — FLUTICASONE PROPIONATE 50 MCG
2 SPRAY, SUSPENSION (ML) NASAL DAILY
Qty: 16 G | Refills: 0 | Status: SHIPPED
Start: 2022-06-15 | End: 2022-08-10

## 2022-06-15 RX ORDER — IBUPROFEN 200 MG
200 TABLET ORAL EVERY 6 HOURS PRN
COMMUNITY
End: 2022-08-10

## 2022-06-15 ASSESSMENT — ENCOUNTER SYMPTOMS
HEADACHES: 1
DIZZINESS: 0
FEVER: 1
VOMITING: 0
WHEEZING: 0
DIARRHEA: 0
NAUSEA: 0
SHORTNESS OF BREATH: 0
SORE THROAT: 0
CHILLS: 1
ABDOMINAL PAIN: 0
MYALGIAS: 1
EYE REDNESS: 1
SINUS PAIN: 0
COUGH: 0
DIAPHORESIS: 0
SPUTUM PRODUCTION: 0
PALPITATIONS: 0

## 2022-06-15 ASSESSMENT — FIBROSIS 4 INDEX: FIB4 SCORE: 0.75

## 2022-06-15 NOTE — LETTER
[facility] [address] [address]     Jana 15, 2022    Patient: Mark Muniz   YOB: 1980   Date of Visit: 6/15/2022       To Whom It May Concern:    Mark Muniz was seen and treated in our department on 6/15/2022.  Please excuse his recent absences from work from 6/13/2022 through 6/16/2022.  Currently testing for COVID and influenza.  Cleared to return to work on 6/17/2022 as long as he is afebrile.  Call with questions or concerns at [phone].    Sincerely,     Ron Montes P.A.-C.

## 2022-06-16 DIAGNOSIS — J06.9 UPPER RESPIRATORY TRACT INFECTION, UNSPECIFIED TYPE: ICD-10-CM

## 2022-06-16 LAB — COVID ORDER STATUS COVID19: NORMAL

## 2022-06-16 NOTE — PROGRESS NOTES
Subjective:     CHIEF COMPLAINT  Chief Complaint   Patient presents with   • URI     Chills, achy bones, watery eyes and stuffy nose x 2 days       HPI  Mark Muniz is a very pleasant 41 y.o. male who presents to the clinic with flulike symptoms x2 to 3 days.  Patient states symptoms started with generalized body aches, chills and subjective fever.  The symptoms have gradually improved.  Still becomes achy in the evening.  He also has sinus congestion with clear rhinorrhea.  Has been taking an over-the-counter cold and sinus medication with minimal relief.  Denies any cough, chest pain or shortness of breath.  No known ill contacts.    REVIEW OF SYSTEMS  Review of Systems   Constitutional: Positive for chills, fever and malaise/fatigue. Negative for diaphoresis.   HENT: Positive for congestion. Negative for ear pain, sinus pain and sore throat.    Eyes: Positive for redness.   Respiratory: Negative for cough, sputum production, shortness of breath and wheezing.    Cardiovascular: Negative for chest pain and palpitations.   Gastrointestinal: Negative for abdominal pain, diarrhea, nausea and vomiting.   Musculoskeletal: Positive for myalgias.   Neurological: Positive for headaches. Negative for dizziness.   Endo/Heme/Allergies: Negative for environmental allergies.       PAST MEDICAL HISTORY  Patient Active Problem List    Diagnosis Date Noted   • Fatty liver 08/25/2021   • Healthcare maintenance 08/25/2021   • LFTs abnormal 03/30/2017   • Dyslipidemia 02/01/2017       SURGICAL HISTORY  patient denies any surgical history    ALLERGIES  No Known Allergies    CURRENT MEDICATIONS  Home Medications     Reviewed by Ron Montes P.A.-C. (Physician Assistant) on 06/15/22 at 1721  Med List Status: <None>   Medication Last Dose Status   ibuprofen (MOTRIN) 200 MG Tab Taking Active                SOCIAL HISTORY  Social History     Tobacco Use   • Smoking status: Never Smoker   • Smokeless tobacco: Never Used  "  Vaping Use   • Vaping Use: Never used   Substance and Sexual Activity   • Alcohol use: Yes     Comment: drinks 6-10 beers on the weekend (advised to decrease).    • Drug use: No   • Sexual activity: Yes     Comment:         FAMILY HISTORY  Family History   Problem Relation Age of Onset   • Diabetes Mother    • Cancer Father         esophageal ca          Objective:     VITAL SIGNS: /70 (BP Location: Left arm, Patient Position: Sitting, BP Cuff Size: Large adult)   Pulse 70   Temp 36.8 °C (98.3 °F) (Temporal)   Resp 16   Ht 1.651 m (5' 5\")   Wt 79.4 kg (175 lb)   SpO2 96%   BMI 29.12 kg/m²     PHYSICAL EXAM  Physical Exam  Constitutional:       General: He is not in acute distress.     Appearance: Normal appearance. He is not ill-appearing, toxic-appearing or diaphoretic.   HENT:      Head: Normocephalic and atraumatic.      Right Ear: Tympanic membrane, ear canal and external ear normal.      Left Ear: Tympanic membrane, ear canal and external ear normal.      Nose: Congestion and rhinorrhea present.      Mouth/Throat:      Mouth: Mucous membranes are moist.      Pharynx: Posterior oropharyngeal erythema present. No oropharyngeal exudate.   Eyes:      Comments: Conjunctiva mildly injected bilaterally.   Cardiovascular:      Rate and Rhythm: Normal rate and regular rhythm.      Pulses: Normal pulses.      Heart sounds: Normal heart sounds.   Pulmonary:      Effort: Pulmonary effort is normal.      Breath sounds: Normal breath sounds. No wheezing, rhonchi or rales.   Musculoskeletal:      Cervical back: Normal range of motion. No muscular tenderness.   Lymphadenopathy:      Cervical: No cervical adenopathy.   Skin:     General: Skin is warm and dry.      Capillary Refill: Capillary refill takes less than 2 seconds.   Neurological:      Mental Status: He is alert.   Psychiatric:         Mood and Affect: Mood normal.         Thought Content: Thought content normal.         Assessment/Plan:     1. " Upper respiratory tract infection, unspecified type    - POCT Influenza A/B  - COVID/SARS CoV-2 PCR; Future  - fluticasone (FLONASE) 50 MCG/ACT nasal spray; Administer 2 Sprays into affected nostril(S) every day.  Dispense: 16 g; Refill: 0      MDM/Comments:    -Discussed viral etiology of URI.   -Tested negative for influenza in clinic.  We will send out for COVID PCR testing.  Results will be available in [facility] in the next 24 to 36 hours.    Symptomatic care.  -Oral hydration and rest.   -Over the counter expectorant as directed; Guaifenesin (Mucinex).  -Diphenhydramine as directed for rhinorrhea (runny nose) and sneezing.  --May take over the counter pseudoephedrine for nasal congestion. Can increase your blood pressure.  -Tylenol or ibuprofen for pain and fever as directed.   -Saline nasal spray as a decongestant.    Follow up for shortness of breath, fevers, elevated heart rate, weakness, prolonged cough, chest pain, or any other concerns.      Differential diagnosis, natural history, supportive care, and indications for immediate follow-up discussed. All questions answered. Patient agrees with the plan of care.    Follow-up as needed if symptoms worsen or fail to improve to PCP, Urgent care or Emergency Room.    I have personally reviewed prior external notes and test results pertinent to today's visit.  I have independently reviewed and interpreted all diagnostics ordered during this urgent care acute visit.   Discussed management options (risks,benefits, and alternatives to treatment). Pt expresses understanding and the treatment plan was agreed upon. Questions were encouraged and answered to pt's satisfaction.    Please note that this dictation was created using voice recognition software. I have made a reasonable attempt to correct obvious errors, but I expect that there are errors of grammar and possibly content that I did not discover before finalizing the note.

## 2022-06-17 LAB
SARS-COV-2 RNA RESP QL NAA+PROBE: DETECTED
SPECIMEN SOURCE: ABNORMAL

## 2022-08-10 ENCOUNTER — OFFICE VISIT (OUTPATIENT)
Dept: MEDICAL GROUP | Facility: PHYSICIAN GROUP | Age: 42
End: 2022-08-10
Payer: COMMERCIAL

## 2022-08-10 VITALS
SYSTOLIC BLOOD PRESSURE: 110 MMHG | OXYGEN SATURATION: 96 % | WEIGHT: 171.13 LBS | HEIGHT: 65 IN | DIASTOLIC BLOOD PRESSURE: 74 MMHG | BODY MASS INDEX: 28.51 KG/M2 | RESPIRATION RATE: 20 BRPM | HEART RATE: 68 BPM | TEMPERATURE: 98.5 F

## 2022-08-10 DIAGNOSIS — E78.5 DYSLIPIDEMIA: ICD-10-CM

## 2022-08-10 DIAGNOSIS — Z13.1 ENCOUNTER FOR SCREENING FOR DIABETES MELLITUS: ICD-10-CM

## 2022-08-10 DIAGNOSIS — E66.3 OVERWEIGHT (BMI 25.0-29.9): ICD-10-CM

## 2022-08-10 DIAGNOSIS — Z13.0 SCREENING FOR DEFICIENCY ANEMIA: ICD-10-CM

## 2022-08-10 PROCEDURE — 99214 OFFICE O/P EST MOD 30 MIN: CPT | Performed by: NURSE PRACTITIONER

## 2022-08-10 ASSESSMENT — FIBROSIS 4 INDEX: FIB4 SCORE: 0.75

## 2022-08-10 ASSESSMENT — PATIENT HEALTH QUESTIONNAIRE - PHQ9: CLINICAL INTERPRETATION OF PHQ2 SCORE: 0

## 2022-08-10 NOTE — PROGRESS NOTES
"Subjective  Chief Complaint  Establish care to manage his chronic conditions    History of Present Illness  Mark Muniz is a 41 y.o. male. This patient is here today to establish care.  His prior PCP was Dr. Radames Bruner.    Overweight (BMI 25.0-29.9)  Chronic and ongoing. He states that he could work on his diet and exercise. He does work 6 days a week, 10 hour days. He wants to make some improvements in his lifestyle.    Dyslipidemia  Chronic, ongoing.  Not taking any cholesterol lowering medications.  10-year cardiac risk ASCVD score: 3.4%  Reports diet is \"okay\".   He is not following a low-cholesterol diet.  He is not exercising regularly.  He is not taking ASA daily.  He denies dizziness, claudication, or chest pain.  Due for updated lab work.     2/4/17 07:29 7/18/18 08:13 8/26/21 08:09   Cholesterol,Tot 236 (H) 241 (H) 252 (H)   Triglycerides 329 (H) 471 (H) 278 (H)   HDL 37 ! 40 35 !    (H) see below 161 (H)     Past Medical History    Allergies: Patient has no known allergies.  Past Medical History:   Diagnosis Date    Elevated LFTs     Hyperlipidemia      History reviewed. No pertinent surgical history.  No current [facility]-ordered outpatient medications on file.     No current [facility]-ordered facility-administered medications on file.     Family History:    Family History   Problem Relation Age of Onset    Diabetes Mother     Cancer Father         esophageal ca      Personal/Social History:    Social History     Tobacco Use    Smoking status: Never    Smokeless tobacco: Never   Vaping Use    Vaping Use: Never used   Substance Use Topics    Alcohol use: Yes     Comment: drinks 6-10 beers on the weekend (advised to decrease).     Drug use: No     Social History     Social History Narrative    Not on file      Review of Systems:     General: Negative for fever/chills and unexpected weight change.    Eyes:  Negative for vision changes, eye pain.   Respiratory:  Negative for cough and " "dyspnea.     Cardiovascular:  Negative for chest pain and palpitations.   Musculoskeletal:  Negative for myalgias.    Skin:  Negative for rash.    Neurological:  Negative for numbness/tingling and headaches.     Objective  Physical Exam:   /74 (BP Location: Left arm, Patient Position: Sitting, BP Cuff Size: Adult)   Pulse 68   Temp 36.9 °C (98.5 °F) (Temporal)   Resp 20   Ht 1.651 m (5' 5\")   Wt 77.6 kg (171 lb 2 oz)   SpO2 96%  Body mass index is 28.48 kg/m².  General:  Alert and oriented.  Well appearing.  NAD.  Head:  Normocephalic.   Neck: Supple without JVD. No lymphadenopathy.  Pulmonary:  Normal effort.  Clear to ausculation without rales, ronchi, or wheezing.  Cardiovascular:  Regular rate and rhythm without murmur, rubs or gallop.  Radial pulses are intact and equal bilaterally.  Musculoskeletal:  No extremity cyanosis, clubbing, or edema.  Skin:  Warm and dry.  No obvious lesions.  Psych: Normal mood and affect. Alert and oriented x3. Judgment and insight is normal.      Assessment/Plan   1. Overweight (BMI 25.0-29.9)  Chronic and ongoing.  Educated on a healthy diet and exercise.    2. Dyslipidemia  Chronic and ongoing.  Currently not taking any medications.  Educated on a healthy diet and exercise.  Due for updated labs.  - Lipid Profile; Future    3. Encounter for screening for diabetes mellitus  Due for updated labs.  - Comp Metabolic Panel; Future    4. Screening for deficiency anemia  Due for updated labs.  - CBC WITH DIFFERENTIAL; Future      Health Maintenance: Completed    Return if symptoms worsen or fail to improve.    Please note that this dictation was created using voice recognition software. I have made every reasonable attempt to correct obvious errors, but I expect that there are errors of grammar and possibly content that I did not discover before finalizing the note.    ZION Cedeno  [facility]  "

## 2022-08-10 NOTE — ASSESSMENT & PLAN NOTE
Chronic and ongoing. He states that he could work on his diet and exercise. He does work 6 days a week, 10 hour days. He wants to make some improvements in his lifestyle.

## 2022-08-10 NOTE — ASSESSMENT & PLAN NOTE
"Chronic, ongoing.  Not taking any cholesterol lowering medications.  10-year cardiac risk ASCVD score: 3.4%  Reports diet is \"okay\".   He is not following a low-cholesterol diet.  He is not exercising regularly.  He is not taking ASA daily.  He denies dizziness, claudication, or chest pain.  Due for updated lab work.     2/4/17 07:29 7/18/18 08:13 8/26/21 08:09   Cholesterol,Tot 236 (H) 241 (H) 252 (H)   Triglycerides 329 (H) 471 (H) 278 (H)   HDL 37 ! 40 35 !    (H) see below 161 (H)     "

## 2023-10-02 ENCOUNTER — OFFICE VISIT (OUTPATIENT)
Dept: URGENT CARE | Facility: PHYSICIAN GROUP | Age: 43
End: 2023-10-02
Payer: COMMERCIAL

## 2023-10-02 VITALS
OXYGEN SATURATION: 98 % | BODY MASS INDEX: 28.32 KG/M2 | TEMPERATURE: 97.9 F | DIASTOLIC BLOOD PRESSURE: 76 MMHG | WEIGHT: 170 LBS | SYSTOLIC BLOOD PRESSURE: 122 MMHG | RESPIRATION RATE: 12 BRPM | HEIGHT: 65 IN | HEART RATE: 76 BPM

## 2023-10-02 DIAGNOSIS — J10.1 INFLUENZA A: ICD-10-CM

## 2023-10-02 DIAGNOSIS — J06.9 VIRAL URI: ICD-10-CM

## 2023-10-02 LAB
FLUAV RNA SPEC QL NAA+PROBE: POSITIVE
FLUBV RNA SPEC QL NAA+PROBE: NEGATIVE
RSV RNA SPEC QL NAA+PROBE: NEGATIVE
SARS-COV-2 RNA RESP QL NAA+PROBE: NEGATIVE

## 2023-10-02 PROCEDURE — 99213 OFFICE O/P EST LOW 20 MIN: CPT | Performed by: NURSE PRACTITIONER

## 2023-10-02 PROCEDURE — 0241U POCT CEPHEID COV-2, FLU A/B, RSV - PCR: CPT | Performed by: NURSE PRACTITIONER

## 2023-10-02 PROCEDURE — 3074F SYST BP LT 130 MM HG: CPT | Performed by: NURSE PRACTITIONER

## 2023-10-02 PROCEDURE — 3078F DIAST BP <80 MM HG: CPT | Performed by: NURSE PRACTITIONER

## 2023-10-02 RX ORDER — DEXTROMETHORPHAN HYDROBROMIDE AND PROMETHAZINE HYDROCHLORIDE 15; 6.25 MG/5ML; MG/5ML
5 SYRUP ORAL EVERY 6 HOURS PRN
Qty: 118 ML | Refills: 0 | Status: SHIPPED | OUTPATIENT
Start: 2023-10-02 | End: 2023-10-09

## 2023-10-02 RX ORDER — BENZONATATE 100 MG/1
100 CAPSULE ORAL 3 TIMES DAILY PRN
Qty: 30 CAPSULE | Refills: 0 | Status: SHIPPED | OUTPATIENT
Start: 2023-10-02 | End: 2023-10-12

## 2023-10-02 RX ORDER — OSELTAMIVIR PHOSPHATE 75 MG/1
75 CAPSULE ORAL 2 TIMES DAILY
Qty: 10 CAPSULE | Refills: 0 | Status: SHIPPED | OUTPATIENT
Start: 2023-10-02

## 2023-10-02 NOTE — LETTER
October 2, 2023    To Whom It May Concern:         This is confirmation that Mark Muniz attended his scheduled appointment with ТАТЬЯНА Evans on 10/02/23. Please excuse from work 10/2/23 through 10/3/23.         Sincerely,          YUAN Evans.  [phone]

## 2023-10-02 NOTE — PROGRESS NOTES
Date: 10/02/23     Chief Complaint:    Chief Complaint   Patient presents with    Cough     X 3days: Having flu-like symptoms, fever, achy bones, chest pressure, hot/cold flashes.         History of Present Illness: 43 y.o. male  presents to clinic with 3-day history of cough fever body aches chest congestion hot and cold flashes ear fullness and rhinorrhea.  Sick contacts include his wife who has been sick for approximately a week.  Patient states he has had 3 days of symptoms.  He is using ibuprofen and a homeopathic cold medication.  He states they are not significantly helpful.  He denies any nausea vomiting diarrhea.  No shortness of breath cardiac like chest pain or lower leg swelling.  Patient denies any history of decreased kidney or liver function.         ROS:  As stated in HPI       Medical/SX/ Social History:  Reviewed per chart    Medications:    No current outpatient medications on file prior to visit.     No current facility-administered medications on file prior to visit.        Allergies:    Patient has no known allergies.     Problem list, medications, and allergies reviewed by myself today in Epic       Physical Exam:  Vitals:    10/02/23 0923   BP: 122/76   Pulse: 76   Resp: 12   Temp: 36.6 °C (97.9 °F)   SpO2: 98%        Physical Exam  Constitutional:       General: He is awake.      Appearance: Normal appearance. He is not ill-appearing, toxic-appearing or diaphoretic.   HENT:      Head: Normocephalic and atraumatic.      Right Ear: Tympanic membrane, ear canal and external ear normal.      Left Ear: Tympanic membrane, ear canal and external ear normal.      Nose: Rhinorrhea present. Rhinorrhea is clear.      Mouth/Throat:      Lips: Pink.      Mouth: Mucous membranes are moist.      Tongue: No lesions.      Palate: No lesions.      Pharynx: Posterior oropharyngeal erythema present. No oropharyngeal exudate or uvula swelling.      Tonsils: No tonsillar exudate or tonsillar abscesses.   Eyes:       General: Lids are normal. Gaze aligned appropriately. No allergic shiner or scleral icterus.     Extraocular Movements: Extraocular movements intact.      Conjunctiva/sclera: Conjunctivae normal.      Pupils: Pupils are equal, round, and reactive to light.   Cardiovascular:      Rate and Rhythm: Normal rate and regular rhythm.      Pulses:           Radial pulses are 2+ on the right side and 2+ on the left side.      Heart sounds: Normal heart sounds.   Pulmonary:      Effort: Pulmonary effort is normal.      Breath sounds: Normal breath sounds and air entry. No decreased breath sounds, wheezing, rhonchi or rales.   Abdominal:      General: Abdomen is flat. Bowel sounds are normal.      Palpations: Abdomen is soft.      Tenderness: There is no abdominal tenderness.   Musculoskeletal:      Right lower leg: No edema.      Left lower leg: No edema.   Lymphadenopathy:      Cervical: No cervical adenopathy.   Skin:     General: Skin is warm.      Capillary Refill: Capillary refill takes less than 2 seconds.      Coloration: Skin is not cyanotic or pale.   Neurological:      Mental Status: He is alert and oriented to person, place, and time.      Gait: Gait is intact.   Psychiatric:         Behavior: Behavior normal. Behavior is cooperative.          Diagnostics:      Recent Results (from the past 24 hour(s))   POCT CoV-2, Flu A/B, RSV by PCR    Collection Time: 10/02/23  9:30 AM   Result Value Ref Range    SARS-CoV-2 by PCR Negative Negative, Invalid    Influenza virus A RNA Positive (A) Negative, Invalid    Influenza virus B, PCR Negative Negative, Invalid    RSV, PCR Negative Negative, Invalid         Diagnostics interpreted by myself.      Medical Decision Making / Plan :  I personally reviewed prior external notes and test results pertinent to today's visit. Pt is clinically stable at today's acute urgent care visit.  Patient appears nontoxic with no acute distress noted. Appropriate for outpatient care at this  time. The patient remained stable during the urgent care visit.     Pleasant 43 y.o. male  presented clinic with HPI exam findings consistent with viral URI.  Did send for Promethazine DM as the cough is keeping the patient up at night as well as Tessalon Perles.  Did obtain viral testing, which is positive for influenza A.  Patient is on day 3 of symptoms.  Did send for Tamiflu advised that he must start Tamiflu today in order to improve symptoms.  If he is not able to start Tamiflu today do not take it.  Shared decision-making was utilized with patient for treatment plan. Differential Diagnosis, natural history, and supportive care discussed. Did advise patient on conservative measures for management of symptoms.  Patient is agreeable to pursue adequate rest, adequate hydration, saltwater gargle and Neti pot for any symptoms of upper respiratory congestion.  Over-the-counter analgesia and antipyretics on a p.r.n. basis as needed for pain and fever.  Did discuss over-the-counter cough medications.      1. Viral URI    - POCT CoV-2, Flu A/B, RSV by PCR  - promethazine-dextromethorphan (PROMETHAZINE-DM) 6.25-15 MG/5ML syrup; Take 5 mL by mouth every 6 hours as needed for Cough for up to 7 days. (caution: may cause sedation)  Dispense: 118 mL; Refill: 0  - benzonatate (TESSALON) 100 MG Cap; Take 1 Capsule by mouth 3 times a day as needed for Cough for up to 10 days.  Dispense: 30 Capsule; Refill: 0    2. Influenza A    - oseltamivir (TAMIFLU) 75 MG Cap; Take 1 Capsule by mouth 2 times a day.  Dispense: 10 Capsule; Refill: 0   Medication discussed included indication for use and the potential benefits and side effects. Education was provided regarding the aforementioned assessments.  All of the patient's questions were answered to their satisfaction at the time of discharge. Patient was encouraged to monitor symptoms closely. Those signs and symptoms which would warrant concern and mandate seeking a higher level of  service through the emergency department discussed at length.  Patient stated agreement and understanding of this plan of care.        Disposition:  Patient was discharged in stable condition.    Voice Recognition Disclaimer: Portions of this document were created using voice recognition software. The software does have a chance of producing errors of grammar and possibly content. I have made every reasonable attempt to correct obvious errors, but there may be errors of grammar and possibly content that I did not discover before finalizing the documentation.    Valerie Hinds, [email or web address].

## 2023-10-02 NOTE — LETTER
October 2, 2023    To Whom It May Concern:         This is confirmation that Mark Muniz attended his scheduled appointment with ТАТЬЯНА Evans on 10/02/23.  Please excuse Mark from work starting today 10/2/2023.  He is considered contagious until he is on Tamiflu for 48 hours and no fever for 24 hours.  He may return to work once he is afebrile for 24 hours without the use of Tylenol or ibuprofen.  Soonest he may return to work is 10/4/2023.        Sincerely,          Valerie Hinds A.P.R.N.  [phone]

## 2024-03-28 ENCOUNTER — OFFICE VISIT (OUTPATIENT)
Dept: MEDICAL GROUP | Facility: PHYSICIAN GROUP | Age: 44
End: 2024-03-28
Payer: COMMERCIAL

## 2024-03-28 VITALS
SYSTOLIC BLOOD PRESSURE: 112 MMHG | TEMPERATURE: 97.8 F | WEIGHT: 177.6 LBS | BODY MASS INDEX: 29.59 KG/M2 | RESPIRATION RATE: 18 BRPM | DIASTOLIC BLOOD PRESSURE: 70 MMHG | OXYGEN SATURATION: 96 % | HEART RATE: 67 BPM | HEIGHT: 65 IN

## 2024-03-28 DIAGNOSIS — Z00.00 WELLNESS EXAMINATION: ICD-10-CM

## 2024-03-28 DIAGNOSIS — R79.89 LFTS ABNORMAL: ICD-10-CM

## 2024-03-28 DIAGNOSIS — E78.5 DYSLIPIDEMIA: ICD-10-CM

## 2024-03-28 DIAGNOSIS — K76.0 FATTY LIVER: ICD-10-CM

## 2024-03-28 DIAGNOSIS — E55.9 VITAMIN D DEFICIENCY: ICD-10-CM

## 2024-03-28 DIAGNOSIS — R63.5 WEIGHT GAIN: ICD-10-CM

## 2024-03-28 PROCEDURE — 99214 OFFICE O/P EST MOD 30 MIN: CPT | Performed by: FAMILY MEDICINE

## 2024-03-28 PROCEDURE — 3074F SYST BP LT 130 MM HG: CPT | Performed by: FAMILY MEDICINE

## 2024-03-28 PROCEDURE — 3078F DIAST BP <80 MM HG: CPT | Performed by: FAMILY MEDICINE

## 2024-03-28 ASSESSMENT — PATIENT HEALTH QUESTIONNAIRE - PHQ9: CLINICAL INTERPRETATION OF PHQ2 SCORE: 0

## 2024-03-28 NOTE — PROGRESS NOTES
"Subjective:     CC:   Chief Complaint   Patient presents with    Follow-Up       HPI:   Mark presents today establish care.  Patient did have lab work done in 2021 he has since not seeing a provider.  Patient is concerned over elevated liver tests in the past and also his cholesterol.  Patient denies any black or bloody stools or issues with his urination.    Past Medical History:   Diagnosis Date    Elevated LFTs     Hyperlipidemia        Social History     Tobacco Use    Smoking status: Never    Smokeless tobacco: Never   Vaping Use    Vaping Use: Never used   Substance Use Topics    Alcohol use: Yes     Comment: OCC    Drug use: No       No current [facility]-ordered outpatient medications on file.     No current Epic-ordered facility-administered medications on file.       Allergies:  Patient has no known allergies.    Health Maintenance: Completed    ROS:  Gen: no fevers/chills, patient has gained some weight  Eyes: no changes in vision  ENT: no sore throat, no hearing loss, no bloody nose  Pulm: no sob, no cough  CV: no chest pain, no palpitations  GI: no nausea/vomiting, no diarrhea  : no dysuria  Neuro: no headaches, no numbness/tingling  Heme/Lymph: no easy bruising    Objective:     Exam:  /70 (BP Location: Left arm, Patient Position: Sitting, BP Cuff Size: Adult)   Pulse 67   Temp 36.6 °C (97.8 °F) (Temporal)   Resp 18   Ht 1.651 m (5' 5\")   Wt 80.6 kg (177 lb 9.6 oz)   SpO2 96%   BMI 29.55 kg/m²  Body mass index is 29.55 kg/m².    Gen: Alert and oriented, No apparent distress.  Skin: Warm and dry.  No obvious lesions.  Eyes: Sclera wnl Pupils normal in size  ENT: Canals wnl and TM are not red, mouth negative redness or exudates  Lungs: Normal effort, CTA bilaterally, no wheezes, rhonchi, or rales  CV: Regular rate and rhythm. No murmurs, rubs, or gallops.  ABD: Soft non-tender no organomegaly  Musculoskeletal: Normal gait. No extremity cyanosis, clubbing, or edema.  Neuro: Oriented to person, " place and time  Psych: Mood is wnl     Labs: Patient given a listing of all the [facility] labs would like patient to be fasting    Assessment & Plan:     43 y.o. male with the following -     1. LFTs abnormal  Would recommend getting lab work done patient agreeable with the plan we will see him back in 3 to 4 weeks  - Comp Metabolic Panel; Future    2. Dyslipidemia  Recommend rechecking his lipid panel  - Comp Metabolic Panel; Future  - Lipid Profile; Future    3. Fatty liver  May need to consider doing a ultrasound of his liver depending what his liver test show.  Last ultrasound done of his liver was done in 2017 that showed a fatty liver    4. Weight gain  Recommend checking his thyroid test  - TSH; Future    5. Wellness examination  - CBC WITH DIFFERENTIAL; Future    6. Vitamin D deficiency  - VITAMIN D,25 HYDROXY (DEFICIENCY); Future       Return in about 3 weeks (around 4/18/2024), or if symptoms worsen or fail to improve.    Please note that this dictation was created using voice recognition software. I have made every reasonable attempt to correct obvious errors, but I expect that there are errors of grammar and possibly content that I did not discover before finalizing the note.

## 2024-04-03 ENCOUNTER — HOSPITAL ENCOUNTER (OUTPATIENT)
Dept: LAB | Facility: MEDICAL CENTER | Age: 44
End: 2024-04-03
Attending: FAMILY MEDICINE
Payer: COMMERCIAL

## 2024-04-03 DIAGNOSIS — R63.5 WEIGHT GAIN: ICD-10-CM

## 2024-04-03 DIAGNOSIS — E55.9 VITAMIN D DEFICIENCY: ICD-10-CM

## 2024-04-03 DIAGNOSIS — E78.5 DYSLIPIDEMIA: ICD-10-CM

## 2024-04-03 DIAGNOSIS — Z00.00 WELLNESS EXAMINATION: ICD-10-CM

## 2024-04-03 DIAGNOSIS — R79.89 LFTS ABNORMAL: ICD-10-CM

## 2024-04-03 LAB
25(OH)D3 SERPL-MCNC: 19 NG/ML (ref 30–100)
ALBUMIN SERPL BCP-MCNC: 4.9 G/DL (ref 3.2–4.9)
ALBUMIN/GLOB SERPL: 2 G/DL
ALP SERPL-CCNC: 78 U/L (ref 30–99)
ALT SERPL-CCNC: 55 U/L (ref 2–50)
ANION GAP SERPL CALC-SCNC: 12 MMOL/L (ref 7–16)
AST SERPL-CCNC: 31 U/L (ref 12–45)
BASOPHILS # BLD AUTO: 0.2 % (ref 0–1.8)
BASOPHILS # BLD: 0.01 K/UL (ref 0–0.12)
BILIRUB SERPL-MCNC: 0.4 MG/DL (ref 0.1–1.5)
BUN SERPL-MCNC: 15 MG/DL (ref 8–22)
CALCIUM ALBUM COR SERPL-MCNC: 8.7 MG/DL (ref 8.5–10.5)
CALCIUM SERPL-MCNC: 9.4 MG/DL (ref 8.5–10.5)
CHLORIDE SERPL-SCNC: 105 MMOL/L (ref 96–112)
CHOLEST SERPL-MCNC: 229 MG/DL (ref 100–199)
CO2 SERPL-SCNC: 25 MMOL/L (ref 20–33)
CREAT SERPL-MCNC: 0.89 MG/DL (ref 0.5–1.4)
EOSINOPHIL # BLD AUTO: 0.06 K/UL (ref 0–0.51)
EOSINOPHIL NFR BLD: 1.4 % (ref 0–6.9)
ERYTHROCYTE [DISTWIDTH] IN BLOOD BY AUTOMATED COUNT: 40.8 FL (ref 35.9–50)
FASTING STATUS PATIENT QL REPORTED: NORMAL
GFR SERPLBLD CREATININE-BSD FMLA CKD-EPI: 109 ML/MIN/1.73 M 2
GLOBULIN SER CALC-MCNC: 2.4 G/DL (ref 1.9–3.5)
GLUCOSE SERPL-MCNC: 96 MG/DL (ref 65–99)
HCT VFR BLD AUTO: 51.5 % (ref 42–52)
HDLC SERPL-MCNC: 34 MG/DL
HGB BLD-MCNC: 17 G/DL (ref 14–18)
IMM GRANULOCYTES # BLD AUTO: 0.01 K/UL (ref 0–0.11)
IMM GRANULOCYTES NFR BLD AUTO: 0.2 % (ref 0–0.9)
LDLC SERPL CALC-MCNC: 158 MG/DL
LYMPHOCYTES # BLD AUTO: 1.94 K/UL (ref 1–4.8)
LYMPHOCYTES NFR BLD: 44.5 % (ref 22–41)
MCH RBC QN AUTO: 29.1 PG (ref 27–33)
MCHC RBC AUTO-ENTMCNC: 33 G/DL (ref 32.3–36.5)
MCV RBC AUTO: 88 FL (ref 81.4–97.8)
MONOCYTES # BLD AUTO: 0.33 K/UL (ref 0–0.85)
MONOCYTES NFR BLD AUTO: 7.6 % (ref 0–13.4)
NEUTROPHILS # BLD AUTO: 2.01 K/UL (ref 1.82–7.42)
NEUTROPHILS NFR BLD: 46.1 % (ref 44–72)
NRBC # BLD AUTO: 0 K/UL
NRBC BLD-RTO: 0 /100 WBC (ref 0–0.2)
PLATELET # BLD AUTO: 226 K/UL (ref 164–446)
PMV BLD AUTO: 10.3 FL (ref 9–12.9)
POTASSIUM SERPL-SCNC: 4.8 MMOL/L (ref 3.6–5.5)
PROT SERPL-MCNC: 7.3 G/DL (ref 6–8.2)
RBC # BLD AUTO: 5.85 M/UL (ref 4.7–6.1)
SODIUM SERPL-SCNC: 142 MMOL/L (ref 135–145)
TRIGL SERPL-MCNC: 185 MG/DL (ref 0–149)
TSH SERPL DL<=0.005 MIU/L-ACNC: 1.85 UIU/ML (ref 0.38–5.33)
WBC # BLD AUTO: 4.4 K/UL (ref 4.8–10.8)

## 2024-04-03 PROCEDURE — 85025 COMPLETE CBC W/AUTO DIFF WBC: CPT

## 2024-04-03 PROCEDURE — 84443 ASSAY THYROID STIM HORMONE: CPT

## 2024-04-03 PROCEDURE — 80061 LIPID PANEL: CPT

## 2024-04-03 PROCEDURE — 80053 COMPREHEN METABOLIC PANEL: CPT

## 2024-04-03 PROCEDURE — 36415 COLL VENOUS BLD VENIPUNCTURE: CPT

## 2024-04-03 PROCEDURE — 82306 VITAMIN D 25 HYDROXY: CPT

## 2024-04-12 ENCOUNTER — OFFICE VISIT (OUTPATIENT)
Dept: MEDICAL GROUP | Facility: PHYSICIAN GROUP | Age: 44
End: 2024-04-12
Payer: COMMERCIAL

## 2024-04-12 VITALS
BODY MASS INDEX: 29.85 KG/M2 | DIASTOLIC BLOOD PRESSURE: 74 MMHG | TEMPERATURE: 98.4 F | OXYGEN SATURATION: 95 % | HEIGHT: 65 IN | WEIGHT: 179.2 LBS | RESPIRATION RATE: 16 BRPM | SYSTOLIC BLOOD PRESSURE: 116 MMHG | HEART RATE: 74 BPM

## 2024-04-12 DIAGNOSIS — E55.9 VITAMIN D DEFICIENCY: ICD-10-CM

## 2024-04-12 DIAGNOSIS — R63.5 WEIGHT GAIN: ICD-10-CM

## 2024-04-12 DIAGNOSIS — E78.5 DYSLIPIDEMIA: ICD-10-CM

## 2024-04-12 DIAGNOSIS — Z11.4 SCREENING FOR HIV (HUMAN IMMUNODEFICIENCY VIRUS): ICD-10-CM

## 2024-04-12 DIAGNOSIS — R79.89 LFTS ABNORMAL: ICD-10-CM

## 2024-04-12 PROCEDURE — 3074F SYST BP LT 130 MM HG: CPT | Performed by: FAMILY MEDICINE

## 2024-04-12 PROCEDURE — 99214 OFFICE O/P EST MOD 30 MIN: CPT | Performed by: FAMILY MEDICINE

## 2024-04-12 PROCEDURE — 3078F DIAST BP <80 MM HG: CPT | Performed by: FAMILY MEDICINE

## 2024-04-12 ASSESSMENT — FIBROSIS 4 INDEX: FIB4 SCORE: 0.8

## 2024-04-12 NOTE — PROGRESS NOTES
"Subjective:     CC:   Chief Complaint   Patient presents with    Follow-Up       HPI:   Mark presents today for follow-up of his labs.  Patient states he is doing well otherwise and has no concerns    Past Medical History:   Diagnosis Date    Elevated LFTs     Hyperlipidemia        Social History     Tobacco Use    Smoking status: Never    Smokeless tobacco: Never   Vaping Use    Vaping Use: Never used   Substance Use Topics    Alcohol use: Yes     Comment: OCC    Drug use: No       No current Epic-ordered outpatient medications on file.     No current Epic-ordered facility-administered medications on file.       Allergies:  Patient has no known allergies.    Health Maintenance: Completed    ROS:  Gen: no fevers/chills, patient has gained a couple pounds since have last seen him  Eyes: no changes in vision  ENT: no sore throat, no hearing loss, no bloody nose  Pulm: no sob, no cough  CV: no chest pain, no palpitations  GI: no nausea/vomiting, no diarrhea  : no dysuria  Neuro: no headaches, no numbness/tingling  Heme/Lymph: no easy bruising    Objective:     Exam:  /74 (BP Location: Right arm, Patient Position: Sitting, BP Cuff Size: Adult)   Pulse 74   Temp 36.9 °C (98.4 °F) (Temporal)   Resp 16   Ht 1.651 m (5' 5\")   Wt 81.3 kg (179 lb 3.2 oz)   SpO2 95%   BMI 29.82 kg/m²  Body mass index is 29.82 kg/m².    Gen: Alert and oriented, No apparent distress.  Skin: Warm and dry.  No obvious lesions.  Eyes: Sclera wnl Pupils normal in size  Lungs: Normal effort, CTA bilaterally, no wheezes, rhonchi, or rales  CV: Regular rate and rhythm. No murmurs, rubs, or gallops.  ABD: Soft non-tender no organomegaly  Musculoskeletal: Normal gait. No extremity cyanosis, clubbing, or edema.  Neuro: Oriented to person, place and time  Psych: Mood is wnl       Assessment & Plan:     43 y.o. male with the following -     1. Dyslipidemia  I did go into detail on his lipid panel his cholesterol and triglycerides are " elevated his HDL is low.  I discussed a low-fat diet and what items he should avoid.  Also encouraged him to increase his physical activity.  Patient minutes during the wintertime he is less active is planning on becoming more active in the summer.  We should repeat his labs again in 3 months patient would like to see me back in August for follow-up.    2. Vitamin D deficiency  Patient's vitamin D level is very low recommend patient start taking vitamin D3 5000 IUs/day    3. Weight gain  Patient to increase activity level to help lose some weight.    4. LFTs abnormal  Patient's liver test is slightly elevated we will repeat this again when I see him back in August    5. Screening for HIV (human immunodeficiency virus)  - HIV AG/AB COMBO ASSAY SCREENING; Future       Return in about 4 months (around 8/12/2024), or if symptoms worsen or fail to improve.    Please note that this dictation was created using voice recognition software. I have made every reasonable attempt to correct obvious errors, but I expect that there are errors of grammar and possibly content that I did not discover before finalizing the note.

## 2024-08-16 ENCOUNTER — OFFICE VISIT (OUTPATIENT)
Dept: MEDICAL GROUP | Facility: PHYSICIAN GROUP | Age: 44
End: 2024-08-16
Payer: COMMERCIAL

## 2024-08-16 VITALS
RESPIRATION RATE: 16 BRPM | DIASTOLIC BLOOD PRESSURE: 70 MMHG | HEIGHT: 65 IN | WEIGHT: 176.2 LBS | BODY MASS INDEX: 29.36 KG/M2 | OXYGEN SATURATION: 96 % | SYSTOLIC BLOOD PRESSURE: 118 MMHG | HEART RATE: 76 BPM | TEMPERATURE: 97.5 F

## 2024-08-16 DIAGNOSIS — D70.9 NEUTROPENIA, UNSPECIFIED TYPE (HCC): ICD-10-CM

## 2024-08-16 DIAGNOSIS — H61.22 IMPACTED CERUMEN OF LEFT EAR: ICD-10-CM

## 2024-08-16 DIAGNOSIS — E78.5 DYSLIPIDEMIA: ICD-10-CM

## 2024-08-16 DIAGNOSIS — R79.89 LFTS ABNORMAL: ICD-10-CM

## 2024-08-16 DIAGNOSIS — E55.9 VITAMIN D DEFICIENCY: ICD-10-CM

## 2024-08-16 PROCEDURE — 3074F SYST BP LT 130 MM HG: CPT | Performed by: FAMILY MEDICINE

## 2024-08-16 PROCEDURE — 99214 OFFICE O/P EST MOD 30 MIN: CPT | Performed by: FAMILY MEDICINE

## 2024-08-16 PROCEDURE — 3078F DIAST BP <80 MM HG: CPT | Performed by: FAMILY MEDICINE

## 2024-08-16 ASSESSMENT — FIBROSIS 4 INDEX: FIB4 SCORE: 0.8

## 2024-08-16 NOTE — PROGRESS NOTES
"Subjective:     CC:   Chief Complaint   Patient presents with    Follow-Up       HPI:   Mark presents today for follow-up.  Patient has been having some pressure in his ear.  Unfortunately I forgot to order his labs and patient by the time he remember he needed to get his labs and did notice it he just decided to come for this appointment.  I did recommend him following up with me and I did order his labs and I apologize for forgetting.    Past Medical History:   Diagnosis Date    Elevated LFTs     Hyperlipidemia        Social History     Tobacco Use    Smoking status: Never    Smokeless tobacco: Never   Vaping Use    Vaping status: Never Used   Substance Use Topics    Alcohol use: Yes     Comment: OCC    Drug use: No       No current [facility]-ordered outpatient medications on file.     No current [facility]-ordered facility-administered medications on file.       Allergies:  Patient has no known allergies.    Health Maintenance: Completed    ROS:  Gen: no fevers/chills, patient has lost 3 pounds in 4 months  Eyes: no changes in vision  ENT: no sore throat, no hearing loss, no bloody nose  Pulm: no sob, no cough  CV: no chest pain, no palpitations  GI: no nausea/vomiting, no diarrhea  : no dysuria  Neuro: no headaches, no numbness/tingling  Heme/Lymph: no easy bruising    Objective:     Exam:  /70 (BP Location: Left arm, Patient Position: Sitting, BP Cuff Size: Adult)   Pulse 76   Temp 36.4 °C (97.5 °F) (Temporal)   Resp 16   Ht 1.651 m (5' 5\")   Wt 79.9 kg (176 lb 3.2 oz)   SpO2 96%   BMI 29.32 kg/m²  Body mass index is 29.32 kg/m².    Gen: Alert and oriented, No apparent distress.  Skin: Warm and dry.  No obvious lesions.  Eyes: Sclera wnl Pupils normal in size  ENT: On examination of left ear unable to see eardrum due to cerumen right ear able to see a clear canal and TM within normal limits.  My MA did irrigate his left ear and right and earwax plug was removed.  On reexam and nation of that ear I was able " to see his TM which was within normal limits  Lungs: Normal effort, CTA bilaterally, no wheezes, rhonchi, or rales  CV: Regular rate and rhythm. No murmurs, rubs, or gallops.  Musculoskeletal: Normal gait. No extremity cyanosis, clubbing, or edema.  Neuro: Oriented to person, place and time  Psych: Mood is wnl       Assessment & Plan:     43 y.o. male with the following -     1. Dyslipidemia  Fasting labs were ordered would like to see patient back in a month  - Comp Metabolic Panel; Future  - Lipid Profile; Future    2. Impacted cerumen of left ear  Patient reports that he can hear a lot better after the cerumen was removed    3. LFTs abnormal  Recommend rechecking his labs again and see him back in a month  - Comp Metabolic Panel; Future    4. Vitamin D deficiency  - VITAMIN D,25 HYDROXY (DEFICIENCY); Future    5. Neutropenia, unspecified type (HCC)  - CBC WITH DIFFERENTIAL; Future       Return in about 4 weeks (around 9/13/2024), or if symptoms worsen or fail to improve.    Please note that this dictation was created using voice recognition software. I have made every reasonable attempt to correct obvious errors, but I expect that there are errors of grammar and possibly content that I did not discover before finalizing the note.

## 2024-09-11 ENCOUNTER — HOSPITAL ENCOUNTER (OUTPATIENT)
Dept: LAB | Facility: MEDICAL CENTER | Age: 44
End: 2024-09-11
Attending: FAMILY MEDICINE
Payer: COMMERCIAL

## 2024-09-11 DIAGNOSIS — Z11.4 SCREENING FOR HIV (HUMAN IMMUNODEFICIENCY VIRUS): ICD-10-CM

## 2024-09-11 DIAGNOSIS — D70.9 NEUTROPENIA, UNSPECIFIED TYPE (HCC): ICD-10-CM

## 2024-09-11 DIAGNOSIS — E55.9 VITAMIN D DEFICIENCY: ICD-10-CM

## 2024-09-11 DIAGNOSIS — E78.5 DYSLIPIDEMIA: ICD-10-CM

## 2024-09-11 DIAGNOSIS — R79.89 LFTS ABNORMAL: ICD-10-CM

## 2024-09-11 LAB
25(OH)D3 SERPL-MCNC: 22 NG/ML (ref 30–100)
ALBUMIN SERPL BCP-MCNC: 4.4 G/DL (ref 3.2–4.9)
ALBUMIN/GLOB SERPL: 1.6 G/DL
ALP SERPL-CCNC: 70 U/L (ref 30–99)
ALT SERPL-CCNC: 39 U/L (ref 2–50)
ANION GAP SERPL CALC-SCNC: 11 MMOL/L (ref 7–16)
AST SERPL-CCNC: 24 U/L (ref 12–45)
BASOPHILS # BLD AUTO: 0.3 % (ref 0–1.8)
BASOPHILS # BLD: 0.01 K/UL (ref 0–0.12)
BILIRUB SERPL-MCNC: 0.5 MG/DL (ref 0.1–1.5)
BUN SERPL-MCNC: 15 MG/DL (ref 8–22)
CALCIUM ALBUM COR SERPL-MCNC: 8.8 MG/DL (ref 8.5–10.5)
CALCIUM SERPL-MCNC: 9.1 MG/DL (ref 8.5–10.5)
CHLORIDE SERPL-SCNC: 103 MMOL/L (ref 96–112)
CHOLEST SERPL-MCNC: 217 MG/DL (ref 100–199)
CO2 SERPL-SCNC: 26 MMOL/L (ref 20–33)
CREAT SERPL-MCNC: 0.98 MG/DL (ref 0.5–1.4)
EOSINOPHIL # BLD AUTO: 0.04 K/UL (ref 0–0.51)
EOSINOPHIL NFR BLD: 1 % (ref 0–6.9)
ERYTHROCYTE [DISTWIDTH] IN BLOOD BY AUTOMATED COUNT: 41.2 FL (ref 35.9–50)
GFR SERPLBLD CREATININE-BSD FMLA CKD-EPI: 98 ML/MIN/1.73 M 2
GLOBULIN SER CALC-MCNC: 2.7 G/DL (ref 1.9–3.5)
GLUCOSE SERPL-MCNC: 91 MG/DL (ref 65–99)
HCT VFR BLD AUTO: 50.2 % (ref 42–52)
HDLC SERPL-MCNC: 36 MG/DL
HGB BLD-MCNC: 16.7 G/DL (ref 14–18)
HIV 1+2 AB+HIV1 P24 AG SERPL QL IA: NORMAL
IMM GRANULOCYTES # BLD AUTO: 0.01 K/UL (ref 0–0.11)
IMM GRANULOCYTES NFR BLD AUTO: 0.3 % (ref 0–0.9)
LDLC SERPL CALC-MCNC: 142 MG/DL
LYMPHOCYTES # BLD AUTO: 1.6 K/UL (ref 1–4.8)
LYMPHOCYTES NFR BLD: 41.2 % (ref 22–41)
MCH RBC QN AUTO: 29.4 PG (ref 27–33)
MCHC RBC AUTO-ENTMCNC: 33.3 G/DL (ref 32.3–36.5)
MCV RBC AUTO: 88.4 FL (ref 81.4–97.8)
MONOCYTES # BLD AUTO: 0.28 K/UL (ref 0–0.85)
MONOCYTES NFR BLD AUTO: 7.2 % (ref 0–13.4)
NEUTROPHILS # BLD AUTO: 1.94 K/UL (ref 1.82–7.42)
NEUTROPHILS NFR BLD: 50 % (ref 44–72)
NRBC # BLD AUTO: 0 K/UL
NRBC BLD-RTO: 0 /100 WBC (ref 0–0.2)
PLATELET # BLD AUTO: 209 K/UL (ref 164–446)
PMV BLD AUTO: 10.5 FL (ref 9–12.9)
POTASSIUM SERPL-SCNC: 4.2 MMOL/L (ref 3.6–5.5)
PROT SERPL-MCNC: 7.1 G/DL (ref 6–8.2)
RBC # BLD AUTO: 5.68 M/UL (ref 4.7–6.1)
SODIUM SERPL-SCNC: 140 MMOL/L (ref 135–145)
TRIGL SERPL-MCNC: 195 MG/DL (ref 0–149)
WBC # BLD AUTO: 3.9 K/UL (ref 4.8–10.8)

## 2024-09-11 PROCEDURE — 36415 COLL VENOUS BLD VENIPUNCTURE: CPT

## 2024-09-11 PROCEDURE — 85025 COMPLETE CBC W/AUTO DIFF WBC: CPT

## 2024-09-11 PROCEDURE — 80061 LIPID PANEL: CPT

## 2024-09-11 PROCEDURE — 87389 HIV-1 AG W/HIV-1&-2 AB AG IA: CPT

## 2024-09-11 PROCEDURE — 80053 COMPREHEN METABOLIC PANEL: CPT

## 2024-09-11 PROCEDURE — 82306 VITAMIN D 25 HYDROXY: CPT

## 2024-10-09 ENCOUNTER — OFFICE VISIT (OUTPATIENT)
Dept: MEDICAL GROUP | Facility: PHYSICIAN GROUP | Age: 44
End: 2024-10-09
Payer: COMMERCIAL

## 2024-10-09 VITALS
SYSTOLIC BLOOD PRESSURE: 120 MMHG | BODY MASS INDEX: 28.69 KG/M2 | TEMPERATURE: 97.7 F | HEART RATE: 67 BPM | OXYGEN SATURATION: 96 % | DIASTOLIC BLOOD PRESSURE: 76 MMHG | RESPIRATION RATE: 16 BRPM | WEIGHT: 172.2 LBS | HEIGHT: 65 IN

## 2024-10-09 DIAGNOSIS — E78.5 DYSLIPIDEMIA: ICD-10-CM

## 2024-10-09 DIAGNOSIS — R79.89 LFTS ABNORMAL: ICD-10-CM

## 2024-10-09 DIAGNOSIS — E55.9 VITAMIN D DEFICIENCY: ICD-10-CM

## 2024-10-09 DIAGNOSIS — R63.5 WEIGHT GAIN: ICD-10-CM

## 2024-10-09 PROCEDURE — 3078F DIAST BP <80 MM HG: CPT | Performed by: FAMILY MEDICINE

## 2024-10-09 PROCEDURE — 3074F SYST BP LT 130 MM HG: CPT | Performed by: FAMILY MEDICINE

## 2024-10-09 PROCEDURE — 99214 OFFICE O/P EST MOD 30 MIN: CPT | Performed by: FAMILY MEDICINE

## 2024-10-09 ASSESSMENT — FIBROSIS 4 INDEX: FIB4 SCORE: 0.81

## 2025-03-10 ENCOUNTER — HOSPITAL ENCOUNTER (OUTPATIENT)
Dept: LAB | Facility: MEDICAL CENTER | Age: 45
End: 2025-03-10
Attending: FAMILY MEDICINE
Payer: COMMERCIAL

## 2025-03-10 DIAGNOSIS — E78.5 DYSLIPIDEMIA: ICD-10-CM

## 2025-03-10 DIAGNOSIS — R79.89 LFTS ABNORMAL: ICD-10-CM

## 2025-03-10 DIAGNOSIS — E55.9 VITAMIN D DEFICIENCY: ICD-10-CM

## 2025-03-10 LAB
25(OH)D3 SERPL-MCNC: 37 NG/ML (ref 30–100)
ALBUMIN SERPL BCP-MCNC: 4.6 G/DL (ref 3.2–4.9)
ALBUMIN/GLOB SERPL: 1.9 G/DL
ALP SERPL-CCNC: 75 U/L (ref 30–99)
ALT SERPL-CCNC: 80 U/L (ref 2–50)
ANION GAP SERPL CALC-SCNC: 12 MMOL/L (ref 7–16)
AST SERPL-CCNC: 34 U/L (ref 12–45)
BILIRUB SERPL-MCNC: 0.5 MG/DL (ref 0.1–1.5)
BUN SERPL-MCNC: 12 MG/DL (ref 8–22)
CALCIUM ALBUM COR SERPL-MCNC: 9.1 MG/DL (ref 8.5–10.5)
CALCIUM SERPL-MCNC: 9.6 MG/DL (ref 8.5–10.5)
CHLORIDE SERPL-SCNC: 101 MMOL/L (ref 96–112)
CHOLEST SERPL-MCNC: 220 MG/DL (ref 100–199)
CO2 SERPL-SCNC: 27 MMOL/L (ref 20–33)
CREAT SERPL-MCNC: 1.03 MG/DL (ref 0.5–1.4)
GFR SERPLBLD CREATININE-BSD FMLA CKD-EPI: 92 ML/MIN/1.73 M 2
GLOBULIN SER CALC-MCNC: 2.4 G/DL (ref 1.9–3.5)
GLUCOSE SERPL-MCNC: 88 MG/DL (ref 65–99)
HDLC SERPL-MCNC: 34 MG/DL
LDLC SERPL CALC-MCNC: 141 MG/DL
POTASSIUM SERPL-SCNC: 4.3 MMOL/L (ref 3.6–5.5)
PROT SERPL-MCNC: 7 G/DL (ref 6–8.2)
SODIUM SERPL-SCNC: 140 MMOL/L (ref 135–145)
TRIGL SERPL-MCNC: 227 MG/DL (ref 0–149)

## 2025-03-10 PROCEDURE — 36415 COLL VENOUS BLD VENIPUNCTURE: CPT

## 2025-03-10 PROCEDURE — 80053 COMPREHEN METABOLIC PANEL: CPT

## 2025-03-10 PROCEDURE — 82306 VITAMIN D 25 HYDROXY: CPT

## 2025-03-10 PROCEDURE — 80061 LIPID PANEL: CPT

## 2025-03-12 ENCOUNTER — OFFICE VISIT (OUTPATIENT)
Dept: MEDICAL GROUP | Facility: PHYSICIAN GROUP | Age: 45
End: 2025-03-12
Payer: COMMERCIAL

## 2025-03-12 VITALS
RESPIRATION RATE: 16 BRPM | HEART RATE: 66 BPM | OXYGEN SATURATION: 95 % | BODY MASS INDEX: 28.59 KG/M2 | HEIGHT: 65 IN | DIASTOLIC BLOOD PRESSURE: 70 MMHG | SYSTOLIC BLOOD PRESSURE: 122 MMHG | TEMPERATURE: 98.7 F | WEIGHT: 171.6 LBS

## 2025-03-12 DIAGNOSIS — E55.9 VITAMIN D DEFICIENCY: ICD-10-CM

## 2025-03-12 DIAGNOSIS — R79.89 LFTS ABNORMAL: ICD-10-CM

## 2025-03-12 DIAGNOSIS — E78.5 DYSLIPIDEMIA: ICD-10-CM

## 2025-03-12 PROCEDURE — 3074F SYST BP LT 130 MM HG: CPT | Performed by: FAMILY MEDICINE

## 2025-03-12 PROCEDURE — 99214 OFFICE O/P EST MOD 30 MIN: CPT | Performed by: FAMILY MEDICINE

## 2025-03-12 PROCEDURE — 3078F DIAST BP <80 MM HG: CPT | Performed by: FAMILY MEDICINE

## 2025-03-12 ASSESSMENT — PATIENT HEALTH QUESTIONNAIRE - PHQ9: CLINICAL INTERPRETATION OF PHQ2 SCORE: 0

## 2025-03-12 ASSESSMENT — FIBROSIS 4 INDEX: FIB4 SCORE: 0.8

## 2025-03-12 NOTE — PROGRESS NOTES
"Subjective:     CC:   Chief Complaint   Patient presents with    Follow-Up       HPI:   Mark presents today for follow-up.  Patient did have some issues with his tooth and had a dental procedure done.  Patient was looking at his MyChart and noted the liver test he thought was very high he was very concerned about it.  Patient has slowed down on his alcohol drinking.  Patient admits that he has not been physically active because of the weather but would like to start back increasing his activity and watching what he is eating.    Past Medical History:   Diagnosis Date    Elevated LFTs     Hyperlipidemia        Social History     Tobacco Use    Smoking status: Never    Smokeless tobacco: Never   Vaping Use    Vaping status: Never Used   Substance Use Topics    Alcohol use: Yes     Comment: OCC    Drug use: No       No current Epic-ordered outpatient medications on file.     No current [employer]-ordered facility-administered medications on file.       Allergies:  Patient has no known allergies.    Health Maintenance: Completed    ROS:  Gen: no fevers/chills, looks like patient has lost 5 pounds since August  Eyes: no changes in vision  ENT: no sore throat, no hearing loss, no bloody nose  Pulm: no sob, no cough  CV: no chest pain, no palpitations  GI: no nausea/vomiting, no diarrhea  : no dysuria  Neuro: no headaches, no numbness/tingling  Heme/Lymph: no easy bruising    Objective:     Exam:  /70 (BP Location: Right arm, Patient Position: Sitting, BP Cuff Size: Adult)   Pulse 66   Temp 37.1 °C (98.7 °F) (Temporal)   Resp 16   Ht 1.651 m (5' 5\")   Wt 77.8 kg (171 lb 9.6 oz)   SpO2 95%   BMI 28.56 kg/m²  Body mass index is 28.56 kg/m².    Gen: Alert and oriented, No apparent distress.  Skin: Warm and dry.  No obvious lesions.  Eyes: Sclera wnl Pupils normal in size  Lungs: Normal effort, CTA bilaterally, no wheezes, rhonchi, or rales  CV: Regular rate and rhythm. No murmurs, rubs, or gallops.  ABD: Soft " non-tender no organomegaly  Musculoskeletal: Normal gait. No extremity cyanosis, clubbing, or edema.  Neuro: Oriented to person, place and time  Psych: Mood is wnl       Assessment & Plan:     44 y.o. male with the following -     1. Dyslipidemia  Patient's lipid panel is elevated his HDL is low patient wants to continue working on his diet and increase his activity level at this time he does not want to be started on any medication for this.  Will see him back in July I will preorder his labs so he can get them done fasting  - Comp Metabolic Panel; Future  - Lipid Profile; Future    2. LFTs abnormal  When liver test is slightly elevated we will recheck this again I will go ahead and order hepatitis panel  - HEPATITIS PANEL ACUTE(4 COMPONENTS); Future    3. Vitamin D deficiency  Patient's vitamin D is in good range continue present vitamin D3 supplement       Return in about 4 months (around 7/12/2025), or if symptoms worsen or fail to improve.    Please note that this dictation was created using voice recognition software. I have made every reasonable attempt to correct obvious errors, but I expect that there are errors of grammar and possibly content that I did not discover before finalizing the note.

## 2025-07-01 ENCOUNTER — HOSPITAL ENCOUNTER (OUTPATIENT)
Dept: LAB | Facility: MEDICAL CENTER | Age: 45
End: 2025-07-01
Attending: FAMILY MEDICINE
Payer: COMMERCIAL

## 2025-07-01 DIAGNOSIS — E78.5 DYSLIPIDEMIA: ICD-10-CM

## 2025-07-01 DIAGNOSIS — R79.89 LFTS ABNORMAL: ICD-10-CM

## 2025-07-01 LAB
ALBUMIN SERPL BCP-MCNC: 4.5 G/DL (ref 3.2–4.9)
ALBUMIN/GLOB SERPL: 2 G/DL
ALP SERPL-CCNC: 73 U/L (ref 30–99)
ALT SERPL-CCNC: 60 U/L (ref 2–50)
ANION GAP SERPL CALC-SCNC: 13 MMOL/L (ref 7–16)
AST SERPL-CCNC: 32 U/L (ref 12–45)
BILIRUB SERPL-MCNC: 0.5 MG/DL (ref 0.1–1.5)
BUN SERPL-MCNC: 16 MG/DL (ref 8–22)
CALCIUM ALBUM COR SERPL-MCNC: 8.7 MG/DL (ref 8.5–10.5)
CALCIUM SERPL-MCNC: 9.1 MG/DL (ref 8.5–10.5)
CHLORIDE SERPL-SCNC: 103 MMOL/L (ref 96–112)
CHOLEST SERPL-MCNC: 218 MG/DL (ref 100–199)
CO2 SERPL-SCNC: 24 MMOL/L (ref 20–33)
CREAT SERPL-MCNC: 0.97 MG/DL (ref 0.5–1.4)
GFR SERPLBLD CREATININE-BSD FMLA CKD-EPI: 98 ML/MIN/1.73 M 2
GLOBULIN SER CALC-MCNC: 2.3 G/DL (ref 1.9–3.5)
GLUCOSE SERPL-MCNC: 87 MG/DL (ref 65–99)
HAV IGM SERPL QL IA: NORMAL
HBV CORE IGM SER QL: NORMAL
HBV SURFACE AG SER QL: NORMAL
HCV AB SER QL: NORMAL
HDLC SERPL-MCNC: 33 MG/DL
LDLC SERPL CALC-MCNC: 131 MG/DL
POTASSIUM SERPL-SCNC: 4.3 MMOL/L (ref 3.6–5.5)
PROT SERPL-MCNC: 6.8 G/DL (ref 6–8.2)
SODIUM SERPL-SCNC: 140 MMOL/L (ref 135–145)
TRIGL SERPL-MCNC: 272 MG/DL (ref 0–149)

## 2025-07-01 PROCEDURE — 36415 COLL VENOUS BLD VENIPUNCTURE: CPT

## 2025-07-01 PROCEDURE — 80053 COMPREHEN METABOLIC PANEL: CPT

## 2025-07-01 PROCEDURE — 80061 LIPID PANEL: CPT

## 2025-07-01 PROCEDURE — 80074 ACUTE HEPATITIS PANEL: CPT

## 2025-07-02 ENCOUNTER — OFFICE VISIT (OUTPATIENT)
Dept: MEDICAL GROUP | Facility: PHYSICIAN GROUP | Age: 45
End: 2025-07-02
Payer: COMMERCIAL

## 2025-07-02 VITALS
HEART RATE: 65 BPM | SYSTOLIC BLOOD PRESSURE: 118 MMHG | BODY MASS INDEX: 27.69 KG/M2 | OXYGEN SATURATION: 96 % | WEIGHT: 166.2 LBS | DIASTOLIC BLOOD PRESSURE: 76 MMHG | HEIGHT: 65 IN | TEMPERATURE: 98.7 F | RESPIRATION RATE: 18 BRPM

## 2025-07-02 DIAGNOSIS — E78.5 DYSLIPIDEMIA: ICD-10-CM

## 2025-07-02 DIAGNOSIS — R79.89 LFTS ABNORMAL: ICD-10-CM

## 2025-07-02 DIAGNOSIS — R63.5 WEIGHT GAIN: Primary | ICD-10-CM

## 2025-07-02 PROCEDURE — 3078F DIAST BP <80 MM HG: CPT | Performed by: FAMILY MEDICINE

## 2025-07-02 PROCEDURE — 3074F SYST BP LT 130 MM HG: CPT | Performed by: FAMILY MEDICINE

## 2025-07-02 PROCEDURE — 99214 OFFICE O/P EST MOD 30 MIN: CPT | Performed by: FAMILY MEDICINE

## 2025-07-02 ASSESSMENT — FIBROSIS 4 INDEX: FIB4 SCORE: 0.87

## 2025-07-02 NOTE — PROGRESS NOTES
"Subjective:     CC:   Chief Complaint   Patient presents with    Follow-Up     Labs-renown       HPI:   Mark presents today for follow-up of his labs.  Patient is avoiding sugar and feels like that is the reason why he  lost full weight.  Patient does admit that the last 2 weekends he has been drinking alcohol which he usually does not.  Patient also states that he has been eating foods high in cholesterol also.    Past Medical History[1]    Social History[2]    Current Medications and Prescriptions Ordered in Epic[3]    Allergies:  Patient has no known allergies.    Health Maintenance: Completed    ROS:  Gen: no fevers/chills, patient has lost 5 pounds in 4 months  Eyes: no changes in vision  ENT: no sore throat, no hearing loss, no bloody nose  Pulm: no sob, no cough  CV: no chest pain, no palpitations  GI: no nausea/vomiting, no diarrhea  : no dysuria  Neuro: no headaches, no numbness/tingling  Heme/Lymph: no easy bruising    Objective:     Exam:  /76 (BP Location: Right arm, Patient Position: Sitting, BP Cuff Size: Adult)   Pulse 65   Temp 37.1 °C (98.7 °F) (Temporal)   Resp 18   Ht 1.651 m (5' 5\")   Wt 75.4 kg (166 lb 3.2 oz)   SpO2 96%   BMI 27.66 kg/m²  Body mass index is 27.66 kg/m².    Gen: Alert and oriented, No apparent distress.  Skin: Warm and dry.  No obvious lesions.  Eyes: Sclera wnl Pupils normal in size  Lungs: Normal effort, CTA bilaterally, no wheezes, rhonchi, or rales  CV: Regular rate and rhythm. No murmurs, rubs, or gallops.  Musculoskeletal: Normal gait. No extremity cyanosis, clubbing, or edema.  Neuro: Oriented to person, place and time  Psych: Mood is wnl       Assessment & Plan:     44 y.o. male with the following -     1. Dyslipidemia  Patient's cholesterol is elevated his HDL is low.  Patient this time wants to continue to work on his diet.  Did offer starting him on a cholesterol-lowering agent the patient would like to wait.  Will see him back in 3 months patient to " get his lipid panel done again    2. Weight gain  Has been working on losing weight    3. LFTs abnormal  1 of patient's liver test was slightly elevated patient will avoid alcohol.       Return in about 3 months (around 10/2/2025), or if symptoms worsen or fail to improve.  Went over all his lab results and answered his questions    Please note that this dictation was created using voice recognition software. I have made every reasonable attempt to correct obvious errors, but I expect that there are errors of grammar and possibly content that I did not discover before finalizing the note.         [1]   Past Medical History:  Diagnosis Date    Elevated LFTs     Hyperlipidemia    [2]   Social History  Tobacco Use    Smoking status: Never    Smokeless tobacco: Never   Vaping Use    Vaping status: Never Used   Substance Use Topics    Alcohol use: Yes     Comment: OCC    Drug use: No   [3]   No current [facility]-ordered outpatient medications on file.     No current [facility]-ordered facility-administered medications on file.